# Patient Record
Sex: FEMALE | Race: WHITE | NOT HISPANIC OR LATINO | Employment: OTHER | URBAN - NONMETROPOLITAN AREA
[De-identification: names, ages, dates, MRNs, and addresses within clinical notes are randomized per-mention and may not be internally consistent; named-entity substitution may affect disease eponyms.]

---

## 2017-01-18 ENCOUNTER — OFFICE VISIT (OUTPATIENT)
Dept: PAIN MEDICINE | Facility: CLINIC | Age: 67
End: 2017-01-18

## 2017-01-18 VITALS
DIASTOLIC BLOOD PRESSURE: 78 MMHG | SYSTOLIC BLOOD PRESSURE: 130 MMHG | WEIGHT: 181.1 LBS | HEIGHT: 63 IN | BODY MASS INDEX: 32.09 KG/M2

## 2017-01-18 DIAGNOSIS — M25.9 MULTIPLE JOINT COMPLAINTS: Primary | ICD-10-CM

## 2017-01-18 DIAGNOSIS — M79.641 BILATERAL HAND PAIN: ICD-10-CM

## 2017-01-18 DIAGNOSIS — Z79.891 LONG TERM (CURRENT) USE OF OPIATE ANALGESIC: ICD-10-CM

## 2017-01-18 DIAGNOSIS — M79.642 BILATERAL HAND PAIN: ICD-10-CM

## 2017-01-18 DIAGNOSIS — M06.9 RHEUMATOID ARTHRITIS INVOLVING MULTIPLE JOINTS (HCC): ICD-10-CM

## 2017-01-18 PROCEDURE — 99213 OFFICE O/P EST LOW 20 MIN: CPT | Performed by: NURSE PRACTITIONER

## 2017-01-18 NOTE — PROGRESS NOTES
"Neetu Howe is a 66 y.o. female.   1950    HPI:   Location: Joints multiple  Quality: aching, sharp, dull and and no tingling  Severity: 6/10  Timing: constant  Alleviating: pain medication  Aggravating: increased activity      Pt followed up with Dr. Seymour, No new medications added to RA treatment... Dr seymour states \"use opiate medications and plaquenil at this time\"  . Recent increase in Norco medication has had positive effects on pain management. PT states \"does better\". No SE noted. Pt in electric WC.      The following portions of the patient's history were reviewed by me and updated as appropriate: allergies, current medications, past family history, past medical history, past social history, past surgical history and problem list.    Past Medical History   Diagnosis Date   • Arthritis    • Benign essential hypertension    • Borderline glaucoma    • Drug therapy      Long-term drug therapy plaquenil, no adverse effects      • Encounter for long-term (current) drug use      Other long term (current) drug therapy      • Epiretinal membrane      OD   • Fibromyositis    • Joint pain    • Nuclear cataract      refractive change OD      • Rheumatoid arthritis    • Rheumatoid arthritis        Social History     Social History   • Marital status: Single     Spouse name: N/A   • Number of children: N/A   • Years of education: N/A     Occupational History   • Not on file.     Social History Main Topics   • Smoking status: Former Smoker   • Smokeless tobacco: Not on file   • Alcohol use No   • Drug use: No   • Sexual activity: Defer     Other Topics Concern   • Not on file     Social History Narrative       Family History   Problem Relation Age of Onset   • Asthma Mother    • Cancer Father      Other   • Hyperlipidemia Father    • Hypertension Father          Current Outpatient Prescriptions:   •  Abatacept (ORENCIA IV), Infuse  into a venous catheter every 30 (thirty) days., Disp: , Rfl:   •  Ascorbic Acid " (VITAMIN C) 500 MG chewable tablet, Chew 1 tablet daily., Disp: , Rfl:   •  B Complex-Folic Acid (B COMPLEX-VITAMIN B12 PO), Take 0.5 tablets by mouth daily., Disp: , Rfl:   •  Calcium & Magnesium Carbonates (MYLANTA PO), Take 1 tablet by mouth daily as needed. 1 tablet, chewable every day Oral PRN, Disp: , Rfl:   •  esomeprazole (NexIUM) 40 MG capsule, Take 40 mg by mouth daily., Disp: , Rfl:   •  ferrous sulfate 325 (65 FE) MG tablet, Take 325 mg by mouth daily., Disp: , Rfl:   •  FLUZONE HIGH-DOSE 0.5 ML suspension prefilled syringe injection, ADM 0.5ML IM UTD, Disp: , Rfl: 0  •  FOLIC ACID PO, Take 1 tablet by mouth daily., Disp: , Rfl:   •  Hydrocodone-Acetaminophen (LORTAB )  MG per tablet, Take 1 tablet by mouth every 4 (four) hours as needed for moderate pain (4-6)., Disp: , Rfl:   •  HYDROcodone-acetaminophen (NORCO)  MG per tablet, TK 1 T PO  QID, Disp: , Rfl: 0  •  hydroxychloroquine (PLAQUENIL) 200 MG tablet, Take 300 mg by mouth daily. 1.5 tablet every day Oral, Disp: , Rfl:   •  lactulose (CHRONULAC) 10 GM/15ML solution, Take 30 g by mouth 3 (three) times a day., Disp: , Rfl:   •  lisinopril-hydrochlorothiazide (PRINZIDE,ZESTORETIC) 20-12.5 MG per tablet, TK 1 T PO D, Disp: , Rfl: 0  •  LISINOPRIL-HYDROCHLOROTHIAZIDE PO, Take 1 tablet by mouth daily., Disp: , Rfl:   •  omeprazole (priLOSEC) 40 MG capsule, TK 1 C PO D, Disp: , Rfl: 0  •  oxybutynin (DITROPAN) 5 MG tablet, Take 5 mg by mouth 2 (two) times a day., Disp: , Rfl:   •  Polyethylene Glycol 3350 (MIRALAX PO), Take  by mouth daily. Miralax 17 gram/dose Oral Powder, Disp: , Rfl:   •  predniSONE (DELTASONE) 5 MG tablet, TK 2 TS PO D, Disp: , Rfl: 1  •  ranitidine (ZANTAC) 150 MG tablet, Take 150 mg by mouth daily as needed for heartburn., Disp: , Rfl:   •  simethicone (MYLICON) 80 MG chewable tablet, Chew 160 mg daily as needed., Disp: , Rfl:     No Known Allergies      Review of Systems   Musculoskeletal:        Multiple joint  sites     10 system review of systems was reviewed and negative except for above.    Physical Exam   Constitutional: She is oriented to person, place, and time. She appears well-developed and well-nourished. No distress.   Eyes: EOM are normal.   Musculoskeletal:        Right forearm: She exhibits tenderness.        Left forearm: She exhibits tenderness.        Right hand: She exhibits tenderness.        Left hand: She exhibits tenderness.   Multiple joint tenderness noted      Obvious rheumatoid changes to hands.   Neurological: She is alert and oriented to person, place, and time.   Electric wheelchair   Psychiatric: She has a normal mood and affect. Her behavior is normal. Judgment normal.       Neetu was seen today for joint swelling.    Diagnoses and all orders for this visit:    Multiple joint complaints    Bilateral hand pain    Rheumatoid arthritis involving multiple joints    Long term (current) use of opiate analgesic  -     Pain Management Profile (13 Drugs) Urine        Medication: Patient reports no negative side effects, Patient reports appropriate usage and storage habits, Patient's opioid provides enough reflief to be more active and perform activities of daily living with less discomfort. and Refill opioid medication as above. Discussed with Dr. Aguilar, opiate medications refilled x 2 scripts.    Interventional: none at this time. Pt follows up with Dr. Seymour.     Rehab: Home stretches at home.     Behavioral: No aberrant behavior noted. [facility] Report # [id number] was reviewed and is consistent with stated history    Urine drug screen Ordered today to test for drugs of abuse and prescribed medications          This document has been electronically signed by ELAINE Hilario on January 18, 2017 5:51 PM          This document has been electronically signed by ELAINE Hilario on January 18, 2017 5:51 PM

## 2017-01-20 LAB
6MAM UR QL: NOT DETECTED
7AMINOCLONAZEPAM UR QL: NOT DETECTED
A-OH ALPRAZ UR QL: NOT DETECTED
ALPRAZ UR QL: NOT DETECTED
AMPHET UR QL SCN: NOT DETECTED
BARBITURATES UR QL: NOT DETECTED
BUPRENORPHINE UR QL: NOT DETECTED
BZE UR QL: NOT DETECTED
CARBOXYTHC UR QL: NOT DETECTED
CARISOPRODOL UR QL: NOT DETECTED
CLONAZEPAM UR QL: NOT DETECTED
CODEINE UR QL: NOT DETECTED
CREAT UR-MCNC: 42.8 MG/DL (ref 20–400)
DIAZEPAM UR QL: NOT DETECTED
ETHYL GLUCURONIDE UR QL: NOT DETECTED
FENTANYL UR QL: NOT DETECTED
HYDROCODONE UR QL: PRESENT
HYDROMORPHONE UR QL: NOT DETECTED
LORAZEPAM UR QL: NOT DETECTED
MDA UR QL: NOT DETECTED
MDEA UR QL: NOT DETECTED
MDMA UR QL: NOT DETECTED
MEPERIDINE UR QL: NOT DETECTED
METHADONE UR QL: NOT DETECTED
METHAMPHET UR QL: NOT DETECTED
MIDAZOLAM UR QL SCN: NOT DETECTED
MORPHINE UR QL: NOT DETECTED
NORBUPRENORPHINE UR QL CFM: NOT DETECTED
NORDIAZEPAM UR QL: NOT DETECTED
NORFENTANYL UR QL: NOT DETECTED
NORHYDROCODONE UR QL CFM: PRESENT
NOROXYCODONE UR QL CFM: NOT DETECTED
NOROXYMORPHONE: NOT DETECTED
OXAZEPAM UR QL: NOT DETECTED
OXYCODONE UR QL: NOT DETECTED
OXYMORPHONE UR QL: NOT DETECTED
PATHOLOGY STUDY: NORMAL
PCP UR QL: NOT DETECTED
PHENTERMINE UR QL: NOT DETECTED
PPAA UR QL: NOT DETECTED
PROPOXYPH UR QL: NOT DETECTED
SERVICE CMNT-IMP: NORMAL
TAPENTADOL UR QL SCN: NOT DETECTED
TAPENTADOL-O-SULF: NOT DETECTED
TEMAZEPAM UR QL: NOT DETECTED
TRAMADOL UR QL: NOT DETECTED
ZOLPIDEM UR QL: NOT DETECTED

## 2017-01-20 NOTE — PROGRESS NOTES
Addendum:  I have reviewed this patient with ELAINE Renteria.  I agree with the above findings and plan.  I have personally spoken with the patient today, and confirmed key findings.

## 2017-03-24 ENCOUNTER — APPOINTMENT (OUTPATIENT)
Dept: LAB | Facility: HOSPITAL | Age: 67
End: 2017-03-24

## 2017-03-24 ENCOUNTER — OFFICE VISIT (OUTPATIENT)
Dept: OBSTETRICS AND GYNECOLOGY | Facility: CLINIC | Age: 67
End: 2017-03-24

## 2017-03-24 VITALS
BODY MASS INDEX: 31.54 KG/M2 | HEIGHT: 63 IN | SYSTOLIC BLOOD PRESSURE: 120 MMHG | DIASTOLIC BLOOD PRESSURE: 82 MMHG | WEIGHT: 178 LBS

## 2017-03-24 DIAGNOSIS — N95.2 ATROPHIC VAGINITIS: Primary | ICD-10-CM

## 2017-03-24 DIAGNOSIS — N77.1 VAGINITIS, VULVITIS AND VULVOVAGINITIS IN DISEASES CLASSIFIED ELSEWHERE: ICD-10-CM

## 2017-03-24 LAB
BILIRUB UR QL STRIP: NEGATIVE
CANDIDA ALBICANS: NEGATIVE
CLARITY UR: CLEAR
COLOR UR: YELLOW
GARDNERELLA VAGINALIS: NEGATIVE
GLUCOSE UR STRIP-MCNC: NEGATIVE MG/DL
HGB UR QL STRIP.AUTO: NEGATIVE
KETONES UR QL STRIP: NEGATIVE
LEUKOCYTE ESTERASE UR QL STRIP.AUTO: NEGATIVE
NITRITE UR QL STRIP: NEGATIVE
PH UR STRIP.AUTO: 7 [PH] (ref 5–9)
PROT UR QL STRIP: NEGATIVE
SP GR UR STRIP: 1.01 (ref 1–1.03)
TRICHOMONAS VAGINALIS PCR: NEGATIVE
UROBILINOGEN UR QL STRIP: NORMAL

## 2017-03-24 PROCEDURE — 87086 URINE CULTURE/COLONY COUNT: CPT | Performed by: NURSE PRACTITIONER

## 2017-03-24 PROCEDURE — 87480 CANDIDA DNA DIR PROBE: CPT | Performed by: NURSE PRACTITIONER

## 2017-03-24 PROCEDURE — 87510 GARDNER VAG DNA DIR PROBE: CPT | Performed by: NURSE PRACTITIONER

## 2017-03-24 PROCEDURE — 99213 OFFICE O/P EST LOW 20 MIN: CPT | Performed by: NURSE PRACTITIONER

## 2017-03-24 PROCEDURE — 87660 TRICHOMONAS VAGIN DIR PROBE: CPT | Performed by: NURSE PRACTITIONER

## 2017-03-24 PROCEDURE — 81003 URINALYSIS AUTO W/O SCOPE: CPT | Performed by: NURSE PRACTITIONER

## 2017-03-24 NOTE — PROGRESS NOTES
"Subjective   History of Present Illness    Neetu Howe is a 66 y.o. female who presents with c/o vaginal burning that is located on the outside and inside. She has been seen by a urologist who ruled out UTIs and concluded that her issue was related to atrophic vaginitis. This was 1.5 weeks ago. She was prescribed premarin cream and reports that made the burning worse, she was advised to stop taking it then.     Sexually active?: No  Postmenopausal?: Yes  HRT?: no  Hysterectomy?: no    Date last pap: 2004  History of abnormal Pap smear: no  Date of last mammogram: none on file  History of abnormal mammogram: no    /82  Ht 63\" (160 cm)  Wt 178 lb (80.7 kg)  LMP  (LMP Unknown)  Breastfeeding? No  BMI 31.53 kg/m2    Past Medical History:   Diagnosis Date   • Arthritis    • Benign essential hypertension    • Borderline glaucoma    • Drug therapy     Long-term drug therapy plaquenil, no adverse effects      • Encounter for long-term (current) drug use     Other long term (current) drug therapy      • Epiretinal membrane     OD   • Fibromyositis    • Joint pain    • Nuclear cataract     refractive change OD      • Rheumatoid arthritis    • Rheumatoid arthritis        No past surgical history on file.    The following portions of the patient's history were reviewed and updated as appropriate: allergies, current medications, past family history, past medical history, past social history, past surgical history and problem list.    Review of Systems    Constitutional:  No fatigue, no weight loss, no weight gain, no fever, no chills   Respiratory: No dyspnea, no cough, no hemoptysis, no wheezing, no pleuritic pain   Cardiovascular: No chest pain, no palpitations, no arrhythmia, no orthopnea, no nocturnal dyspnea, no edema, no claudication   Breasts: No discharge from nipple, No breast tenderness and No breast mass   Gastrointestinal: No loss of appetite, No dysphagia, No abdominal pain, No nausea, No vomiting, " No change in bowel habits, No diarrhea, No constipation and No blood in stool   Genitourinary: No increased frequency of urination, No dysuria, No hematuria, No nocturia, No urinary incontinence, No vaginal discharge, No abnormal vaginal bleeding, No pelvic pain, No menstrual problem and Menopausal problem   Skin: No skin rash, No skin lesion, No dry skin, No pruritus and No nail problem   Neurologic: No headache, No dizziness, No lightheadedness, No syncope, No vertigo, No weakness, No numbness, No tremor and No paresthesia   Psychiatric: No difficulty sleeping, No mood swings, No feeling anxious, No confusion and No memory loss          Objective   Physical Exam    General:  Alert and oriented x 3, Cooperative, Well developed & well nourished and No acute distress   Genitourinary: Vulva normal, vaginal mucosa abnormal, bladder nondistended and nontender, cervix normal, uterus normal size and nontender, no adnexal/pelvic tenderness or masses, Bartholin's/Askov/Urethral gland WNL, vaginal tissue atrophic   Skin: Skin warm and dry and Pattern of hair growth normal       Assessment/Plan   Neetu was seen today for personal problem.    Diagnoses and all orders for this visit:    Atrophic vaginitis    Vaginitis, vulvitis and vulvovaginitis in diseases classified elsewhere   -     Gardnerella vaginalis, Trichomonas vaginalis, Candida albicans, PCR  -     Urinalysis  -     Urine Culture    Other orders  -     Ospemifene (OSPHENA) 60 MG tablet; Take 1 tablet by mouth Daily.      All questions answered.  Will rule out vaginal infections.  Will rule out UTI.  Follow up if symptoms persist or worsen. Rx osphena.

## 2017-03-25 LAB — BACTERIA SPEC AEROBE CULT: NORMAL

## 2017-03-28 RX ORDER — ESTRADIOL 0.1 MG/G
CREAM VAGINAL
Qty: 42.5 G | Refills: 12 | Status: SHIPPED | OUTPATIENT
Start: 2017-03-28 | End: 2018-06-13

## 2017-03-30 ENCOUNTER — OFFICE VISIT (OUTPATIENT)
Dept: PAIN MEDICINE | Facility: CLINIC | Age: 67
End: 2017-03-30

## 2017-03-30 VITALS
HEIGHT: 63 IN | SYSTOLIC BLOOD PRESSURE: 120 MMHG | WEIGHT: 176 LBS | BODY MASS INDEX: 31.18 KG/M2 | DIASTOLIC BLOOD PRESSURE: 82 MMHG

## 2017-03-30 DIAGNOSIS — M25.9 MULTIPLE JOINT COMPLAINTS: Primary | ICD-10-CM

## 2017-03-30 DIAGNOSIS — Z79.899 HIGH RISK MEDICATIONS (NOT ANTICOAGULANTS) LONG-TERM USE: ICD-10-CM

## 2017-03-30 DIAGNOSIS — Z87.39 HX OF RHEUMATOID ARTHRITIS: ICD-10-CM

## 2017-03-30 PROCEDURE — 99214 OFFICE O/P EST MOD 30 MIN: CPT | Performed by: PAIN MEDICINE

## 2017-03-30 RX ORDER — HYDROCODONE BITARTRATE AND ACETAMINOPHEN 10; 325 MG/1; MG/1
1 TABLET ORAL
Qty: 150 TABLET | Refills: 0 | Status: SHIPPED | OUTPATIENT
Start: 2017-03-30 | End: 2017-04-29

## 2017-03-30 RX ORDER — OXYBUTYNIN CHLORIDE 5 MG/1
5 TABLET ORAL
COMMUNITY
Start: 2016-12-12 | End: 2018-06-13

## 2017-03-30 RX ORDER — OMEPRAZOLE 40 MG/1
40 CAPSULE, DELAYED RELEASE ORAL DAILY
COMMUNITY
Start: 2016-12-12 | End: 2018-06-13

## 2017-03-30 RX ORDER — LISINOPRIL AND HYDROCHLOROTHIAZIDE 20; 12.5 MG/1; MG/1
1 TABLET ORAL DAILY
COMMUNITY
Start: 2016-12-12 | End: 2018-06-13

## 2017-03-30 RX ORDER — HYDROCODONE BITARTRATE AND ACETAMINOPHEN 10; 325 MG/1; MG/1
1 TABLET ORAL EVERY 6 HOURS
COMMUNITY
Start: 2015-08-20 | End: 2018-05-24

## 2017-03-30 RX ORDER — HYDROXYCHLOROQUINE SULFATE 200 MG/1
TABLET, FILM COATED ORAL
COMMUNITY
Start: 2016-12-13 | End: 2018-06-13

## 2017-03-30 NOTE — PROGRESS NOTES
Neetu Howe is a 66 y.o. female.   1950    HPI:   Location: Joints mulitple  Quality: aching, sharp, dull and no tingling  Severity: 6/10  Timing: constant  Alleviating: pain medication  Aggravating: increased activity    Sees Dr. Seymour for Rheum.  Taking new trx.  Doing well.  Patient reports that the opioid medication still provides her good relief and allows increased activity than she would have without the opioid medication.  She denies side effects.      The following portions of the patient's history were reviewed by me and updated as appropriate: allergies, current medications, past family history, past medical history, past social history, past surgical history and problem list.    Past Medical History:   Diagnosis Date   • Arthritis    • Benign essential hypertension    • Borderline glaucoma    • Drug therapy     Long-term drug therapy plaquenil, no adverse effects      • Encounter for long-term (current) drug use     Other long term (current) drug therapy      • Epiretinal membrane     OD   • Fibromyositis    • Joint pain    • Nuclear cataract     refractive change OD      • Rheumatoid arthritis    • Rheumatoid arthritis        Social History     Social History   • Marital status: Single     Spouse name: N/A   • Number of children: N/A   • Years of education: N/A     Occupational History   • Not on file.     Social History Main Topics   • Smoking status: Former Smoker   • Smokeless tobacco: Not on file   • Alcohol use No   • Drug use: No   • Sexual activity: No     Other Topics Concern   • Not on file     Social History Narrative       Family History   Problem Relation Age of Onset   • Asthma Mother    • Cancer Father      Other   • Hyperlipidemia Father    • Hypertension Father          Current Outpatient Prescriptions:   •  Ascorbic Acid (VITAMIN C) 500 MG chewable tablet, Chew 1 tablet daily., Disp: , Rfl:   •  B Complex-Folic Acid (B COMPLEX-VITAMIN B12 PO), Take 0.5 tablets by mouth  daily., Disp: , Rfl:   •  Calcium & Magnesium Carbonates (MYLANTA PO), Take 1 tablet by mouth daily as needed. 1 tablet, chewable every day Oral PRN, Disp: , Rfl:   •  Certolizumab Pegol (CIMZIA PREFILLED SC), Inject  under the skin., Disp: , Rfl:   •  Certolizumab Pegol 2 X 200 MG kit, Inject 400 mg under the skin., Disp: , Rfl:   •  esomeprazole (NexIUM) 40 MG capsule, Take 40 mg by mouth daily., Disp: , Rfl:   •  estradiol (ESTRACE VAGINAL) 0.1 MG/GM vaginal cream, Insert 4 gm daily x 2 wks, then 2 gm daily x 2 wks, then 1 gm twice weekly for maintenance., Disp: 42.5 g, Rfl: 12  •  ferrous sulfate 325 (65 FE) MG tablet, Take 325 mg by mouth daily., Disp: , Rfl:   •  FOLIC ACID PO, Take 1 tablet by mouth daily., Disp: , Rfl:   •  Hydrocodone-Acetaminophen (LORTAB )  MG per tablet, Take 1 tablet by mouth every 4 (four) hours as needed for moderate pain (4-6)., Disp: , Rfl:   •  HYDROcodone-acetaminophen (NORCO)  MG per tablet, TK 1 T PO  QID, Disp: , Rfl: 0  •  hydroxychloroquine (PLAQUENIL) 200 MG tablet, Take 300 mg by mouth Daily. 1.0 tablet every day Oral, Disp: , Rfl:   •  lactulose (CHRONULAC) 10 GM/15ML solution, Take 30 g by mouth 3 (three) times a day., Disp: , Rfl:   •  lisinopril-hydrochlorothiazide (PRINZIDE,ZESTORETIC) 20-12.5 MG per tablet, TK 1 T PO D, Disp: , Rfl: 0  •  omeprazole (priLOSEC) 40 MG capsule, TK 1 C PO D, Disp: , Rfl: 0  •  Ospemifene (OSPHENA) 60 MG tablet, Take 1 tablet by mouth Daily., Disp: 30 tablet, Rfl: 11  •  Ospemifene 60 MG tablet, Take 60 mg by mouth Daily., Disp: 90 tablet, Rfl: 0  •  Polyethylene Glycol 3350 (MIRALAX PO), Take  by mouth daily. Miralax 17 gram/dose Oral Powder, Disp: , Rfl:   •  polyethylene glycol pack, Take 1 packet by mouth., Disp: , Rfl:   •  Probiotic Product ([employer] ADULT PROBIOTIC PO), Take 1 each by mouth., Disp: , Rfl:   •  ranitidine (ZANTAC) 150 MG tablet, Take 150 mg by mouth daily as needed for heartburn., Disp: , Rfl:   •   flintstones complete (FLINTSTONES) 60 MG chewable tablet, Chew., Disp: , Rfl:   •  FLUZONE HIGH-DOSE 0.5 ML suspension prefilled syringe injection, ADM 0.5ML IM UTD, Disp: , Rfl: 0  •  HYDROcodone-acetaminophen (NORCO)  MG per tablet, Take 1 tablet by mouth Every 6 (Six) Hours., Disp: , Rfl:   •  HYDROcodone-acetaminophen (NORCO)  MG per tablet, Take 1 tablet by mouth 5 (Five) Times a Day for 30 days., Disp: 150 tablet, Rfl: 0  •  HYDROcodone-acetaminophen (NORCO)  MG per tablet, Take 1 tablet by mouth 5 (Five) Times a Day for 30 days., Disp: 150 tablet, Rfl: 0  •  hydroxychloroquine (PLAQUENIL) 200 MG tablet, TAKE 1 AND 1/2 TABLET BY MOUTH EVERY DAY, Disp: , Rfl:   •  lisinopril-hydrochlorothiazide (PRINZIDE,ZESTORETIC) 20-12.5 MG per tablet, Take 1 tablet by mouth., Disp: , Rfl:   •  LISINOPRIL-HYDROCHLOROTHIAZIDE PO, Take 1 tablet by mouth daily., Disp: , Rfl:   •  omeprazole (priLOSEC) 40 MG capsule, Take 40 mg by mouth., Disp: , Rfl:   •  oxybutynin (DITROPAN) 5 MG tablet, Take 5 mg by mouth 2 (two) times a day., Disp: , Rfl:   •  oxybutynin (DITROPAN) 5 MG tablet, Take 5 mg by mouth., Disp: , Rfl:   •  predniSONE (DELTASONE) 5 MG tablet, TK 2 TS PO D, Disp: , Rfl: 1  •  simethicone (MYLICON) 80 MG chewable tablet, Chew 160 mg daily as needed., Disp: , Rfl:     Allergies   Allergen Reactions   • Sulfa Antibiotics Nausea Only     Intense nausa   • Doxycycline      Body movements   • Nitrofurantoin      Makes her hurt all over.  Tendons felt like being tore   • Ciprofloxacin Nausea Only         Review of Systems   Musculoskeletal:        Joints multiple     10 system review of systems was reviewed and negative except for above.    Physical Exam   Constitutional: She appears well-developed and well-nourished. No distress.   Musculoskeletal:   Obvious rheumatoid changes to hands.   Neurological: She is alert.   Electric scooter   Psychiatric: She has a normal mood and affect. Her behavior is  normal. Judgment normal.       Neetu was seen today for joint swelling.    Diagnoses and all orders for this visit:    Multiple joint complaints    Hx of rheumatoid arthritis    High risk medications (not anticoagulants) long-term use    Other orders  -     HYDROcodone-acetaminophen (NORCO)  MG per tablet; Take 1 tablet by mouth 5 (Five) Times a Day for 30 days.  -     HYDROcodone-acetaminophen (NORCO)  MG per tablet; Take 1 tablet by mouth 5 (Five) Times a Day for 30 days.      Medication: Patient reports no negative side effects, Patient reports appropriate usage and storage habits, Patient's opioid provides enough reflief to be more active and perform activities of daily living with less discomfort. and Refill opioid medication as above    Interventional: none at this time    Rehab: none at this time    Behavioral: No aberrant behavior noted. [facility] Report #[id number]  was reviewed and is consistent with stated history    Urine drug screen Reviewed from last visit and is appropriate          This document has been electronically signed by Cristopher Aguilar MD on March 30, 2017 7:59 AM

## 2017-05-26 ENCOUNTER — OFFICE VISIT (OUTPATIENT)
Dept: PAIN MEDICINE | Facility: CLINIC | Age: 67
End: 2017-05-26

## 2017-05-26 VITALS
BODY MASS INDEX: 31.89 KG/M2 | SYSTOLIC BLOOD PRESSURE: 120 MMHG | WEIGHT: 180 LBS | HEIGHT: 63 IN | DIASTOLIC BLOOD PRESSURE: 68 MMHG

## 2017-05-26 DIAGNOSIS — Z79.899 HIGH RISK MEDICATIONS (NOT ANTICOAGULANTS) LONG-TERM USE: ICD-10-CM

## 2017-05-26 DIAGNOSIS — M25.9 MULTIPLE JOINT COMPLAINTS: Primary | ICD-10-CM

## 2017-05-26 DIAGNOSIS — M06.9 RHEUMATOID ARTHRITIS INVOLVING MULTIPLE JOINTS (HCC): ICD-10-CM

## 2017-05-26 PROCEDURE — 99213 OFFICE O/P EST LOW 20 MIN: CPT | Performed by: PAIN MEDICINE

## 2017-05-26 RX ORDER — HYDROCODONE BITARTRATE AND ACETAMINOPHEN 10; 325 MG/1; MG/1
1 TABLET ORAL
Qty: 150 TABLET | Refills: 0 | Status: SHIPPED | OUTPATIENT
Start: 2017-05-26 | End: 2017-06-25

## 2017-07-27 ENCOUNTER — OFFICE VISIT (OUTPATIENT)
Dept: PAIN MEDICINE | Facility: CLINIC | Age: 67
End: 2017-07-27

## 2017-07-27 VITALS
WEIGHT: 167 LBS | SYSTOLIC BLOOD PRESSURE: 122 MMHG | BODY MASS INDEX: 29.59 KG/M2 | DIASTOLIC BLOOD PRESSURE: 70 MMHG | HEIGHT: 63 IN

## 2017-07-27 DIAGNOSIS — Z79.891 LONG TERM (CURRENT) USE OF OPIATE ANALGESIC: ICD-10-CM

## 2017-07-27 DIAGNOSIS — M06.9 RHEUMATOID ARTHRITIS INVOLVING MULTIPLE JOINTS (HCC): ICD-10-CM

## 2017-07-27 DIAGNOSIS — M25.9 MULTIPLE JOINT COMPLAINTS: Primary | ICD-10-CM

## 2017-07-27 PROCEDURE — 99213 OFFICE O/P EST LOW 20 MIN: CPT | Performed by: PAIN MEDICINE

## 2017-07-27 RX ORDER — HYDROCODONE BITARTRATE AND ACETAMINOPHEN 10; 325 MG/1; MG/1
1 TABLET ORAL
Qty: 150 TABLET | Refills: 0 | Status: SHIPPED | OUTPATIENT
Start: 2017-07-27 | End: 2017-08-26

## 2017-07-27 NOTE — PROGRESS NOTES
Neetu Howe is a 66 y.o. female.   1950    HPI:   Location: Joints  Quality: sharp  Severity: 10/10  Timing: constant  Alleviating: pain medication  Aggravating: increased activity     Patient remains on chronic long-term opioid medication for management of joint pains from rheumatoid arthritis.  She continues to see rheumatology.  She sees him again in 2 weeks.  The opioid medication does provide her relief and allows her to be as active as she can be.  She continues to use a motorized scooter for ambulation.    The following portions of the patient's history were reviewed by me and updated as appropriate: allergies, current medications, past family history, past medical history, past social history, past surgical history and problem list.    Past Medical History:   Diagnosis Date   • Arthritis    • Benign essential hypertension    • Borderline glaucoma    • Drug therapy     Long-term drug therapy plaquenil, no adverse effects      • Encounter for long-term (current) drug use     Other long term (current) drug therapy      • Epiretinal membrane     OD   • Fibromyositis    • Joint pain    • Nuclear cataract     refractive change OD      • Rheumatoid arthritis    • Rheumatoid arthritis        Social History     Social History   • Marital status: Single     Spouse name: N/A   • Number of children: N/A   • Years of education: N/A     Occupational History   • Not on file.     Social History Main Topics   • Smoking status: Former Smoker   • Smokeless tobacco: Never Used   • Alcohol use No   • Drug use: No   • Sexual activity: No     Other Topics Concern   • Not on file     Social History Narrative       Family History   Problem Relation Age of Onset   • Asthma Mother    • Cancer Father      Other   • Hyperlipidemia Father    • Hypertension Father          Current Outpatient Prescriptions:   •  Ascorbic Acid (VITAMIN C) 500 MG chewable tablet, Chew 1 tablet daily., Disp: , Rfl:   •  B Complex-Folic Acid (B  COMPLEX-VITAMIN B12 PO), Take 0.5 tablets by mouth daily., Disp: , Rfl:   •  Calcium & Magnesium Carbonates (MYLANTA PO), Take 1 tablet by mouth daily as needed. 1 tablet, chewable every day Oral PRN, Disp: , Rfl:   •  Certolizumab Pegol (CIMZIA PREFILLED SC), Inject  under the skin., Disp: , Rfl:   •  Certolizumab Pegol 2 X 200 MG kit, Inject 400 mg under the skin., Disp: , Rfl:   •  esomeprazole (NexIUM) 40 MG capsule, Take 40 mg by mouth daily., Disp: , Rfl:   •  estradiol (ESTRACE VAGINAL) 0.1 MG/GM vaginal cream, Insert 4 gm daily x 2 wks, then 2 gm daily x 2 wks, then 1 gm twice weekly for maintenance., Disp: 42.5 g, Rfl: 12  •  ferrous sulfate 325 (65 FE) MG tablet, Take 325 mg by mouth daily., Disp: , Rfl:   •  flintstones complete (FLINTSTONES) 60 MG chewable tablet, Chew., Disp: , Rfl:   •  FLUZONE HIGH-DOSE 0.5 ML suspension prefilled syringe injection, ADM 0.5ML IM UTD, Disp: , Rfl: 0  •  FOLIC ACID PO, Take 1 tablet by mouth daily., Disp: , Rfl:   •  Hydrocodone-Acetaminophen (LORTAB )  MG per tablet, Take 1 tablet by mouth every 4 (four) hours as needed for moderate pain (4-6)., Disp: , Rfl:   •  HYDROcodone-acetaminophen (NORCO)  MG per tablet, TK 1 T PO  QID, Disp: , Rfl: 0  •  HYDROcodone-acetaminophen (NORCO)  MG per tablet, Take 1 tablet by mouth Every 6 (Six) Hours., Disp: , Rfl:   •  hydroxychloroquine (PLAQUENIL) 200 MG tablet, Take 300 mg by mouth Daily. 1.0 tablet every day Oral, Disp: , Rfl:   •  hydroxychloroquine (PLAQUENIL) 200 MG tablet, TAKE 1 AND 1/2 TABLET BY MOUTH EVERY DAY, Disp: , Rfl:   •  lactulose (CHRONULAC) 10 GM/15ML solution, Take 30 g by mouth 3 (three) times a day., Disp: , Rfl:   •  lisinopril-hydrochlorothiazide (PRINZIDE,ZESTORETIC) 20-12.5 MG per tablet, TK 1 T PO D, Disp: , Rfl: 0  •  lisinopril-hydrochlorothiazide (PRINZIDE,ZESTORETIC) 20-12.5 MG per tablet, Take 1 tablet by mouth., Disp: , Rfl:   •  LISINOPRIL-HYDROCHLOROTHIAZIDE PO, Take 1  tablet by mouth daily., Disp: , Rfl:   •  omeprazole (priLOSEC) 40 MG capsule, TK 1 C PO D, Disp: , Rfl: 0  •  omeprazole (priLOSEC) 40 MG capsule, Take 40 mg by mouth., Disp: , Rfl:   •  Ospemifene (OSPHENA) 60 MG tablet, Take 1 tablet by mouth Daily., Disp: 30 tablet, Rfl: 11  •  Ospemifene 60 MG tablet, Take 60 mg by mouth Daily., Disp: 90 tablet, Rfl: 0  •  oxybutynin (DITROPAN) 5 MG tablet, Take 5 mg by mouth 2 (two) times a day., Disp: , Rfl:   •  oxybutynin (DITROPAN) 5 MG tablet, Take 5 mg by mouth., Disp: , Rfl:   •  Polyethylene Glycol 3350 (MIRALAX PO), Take  by mouth daily. Miralax 17 gram/dose Oral Powder, Disp: , Rfl:   •  polyethylene glycol pack, Take 1 packet by mouth., Disp: , Rfl:   •  predniSONE (DELTASONE) 5 MG tablet, TK 2 TS PO D, Disp: , Rfl: 1  •  Probiotic Product ([employer] ADULT PROBIOTIC PO), Take 1 each by mouth., Disp: , Rfl:   •  ranitidine (ZANTAC) 150 MG tablet, Take 150 mg by mouth daily as needed for heartburn., Disp: , Rfl:   •  simethicone (MYLICON) 80 MG chewable tablet, Chew 160 mg daily as needed., Disp: , Rfl:   •  HYDROcodone-acetaminophen (NORCO)  MG per tablet, Take 1 tablet by mouth 5 (Five) Times a Day for 30 days., Disp: 150 tablet, Rfl: 0  •  HYDROcodone-acetaminophen (NORCO)  MG per tablet, Take 1 tablet by mouth 5 (Five) Times a Day for 30 days., Disp: 150 tablet, Rfl: 0    Allergies   Allergen Reactions   • Sulfa Antibiotics Nausea Only     Intense nausa   • Doxycycline      Body movements  Body movements   • Nitrofurantoin      Makes her hurt all over.  Tendons felt like being tore  Makes her hurt all over.  Tendons felt like being tore   • Ciprofloxacin Nausea Only       Review of Systems   Musculoskeletal: Positive for joint swelling (all over joint pain).   All other systems reviewed and are negative.    All systems reviewed and negative except for above.    Physical Exam   Constitutional: She appears well-developed and well-nourished. No distress.    Musculoskeletal:   Obvious rheumatoid changes to hands.    Painful attempted shoulder abduction bilaterally, worse on right    Neurological: She is alert.   Electric scooter   Psychiatric: She has a normal mood and affect. Her behavior is normal. Judgment normal.       Neetu was seen today for joint pain.    Diagnoses and all orders for this visit:    Multiple joint complaints    Rheumatoid arthritis involving multiple joints    Long term (current) use of opiate analgesic    Other orders  -     HYDROcodone-acetaminophen (NORCO)  MG per tablet; Take 1 tablet by mouth 5 (Five) Times a Day for 30 days.  -     HYDROcodone-acetaminophen (NORCO)  MG per tablet; Take 1 tablet by mouth 5 (Five) Times a Day for 30 days.      Medication: Patient reports no negative side effects, Patient reports appropriate usage and storage habits, Patient's opioid provides enough reflief to be more active and perform activities of daily living with less discomfort. and Refill opioid medication as above    Interventional: none at this time    Rehab: none at this time    Behavioral: No aberrant behavior noted. [facility] Report #[id number]  was reviewed and is consistent with stated history    Urine drug screen None at this time          This document has been electronically signed by Cristopher Aguilar MD on July 27, 2017 7:51 AM

## 2017-09-26 ENCOUNTER — OFFICE VISIT (OUTPATIENT)
Dept: PAIN MEDICINE | Facility: CLINIC | Age: 67
End: 2017-09-26

## 2017-09-26 VITALS
DIASTOLIC BLOOD PRESSURE: 70 MMHG | SYSTOLIC BLOOD PRESSURE: 122 MMHG | HEIGHT: 64 IN | WEIGHT: 164 LBS | BODY MASS INDEX: 28 KG/M2

## 2017-09-26 DIAGNOSIS — Z79.899 HIGH RISK MEDICATIONS (NOT ANTICOAGULANTS) LONG-TERM USE: ICD-10-CM

## 2017-09-26 DIAGNOSIS — M25.9 MULTIPLE JOINT COMPLAINTS: Primary | ICD-10-CM

## 2017-09-26 DIAGNOSIS — M06.9 RHEUMATOID ARTHRITIS INVOLVING MULTIPLE JOINTS (HCC): ICD-10-CM

## 2017-09-26 PROCEDURE — 99213 OFFICE O/P EST LOW 20 MIN: CPT | Performed by: PAIN MEDICINE

## 2017-09-26 RX ORDER — HYDROCODONE BITARTRATE AND ACETAMINOPHEN 10; 325 MG/1; MG/1
1 TABLET ORAL
Qty: 150 TABLET | Refills: 0 | Status: SHIPPED | OUTPATIENT
Start: 2017-09-26 | End: 2017-10-26

## 2017-09-26 NOTE — PROGRESS NOTES
Neetu Howe is a 67 y.o. female.   1950    HPI:   Location: Joints  Quality: sharp  Severity: 10/10  Timing: constant  Alleviating: pain medication  Aggravating: increased activity    No changes to her rheumatoid treatment.  Her rheumatologist is retiring this month and will be seeing a new one.  Her opioid medication continues to provide relief and allows her to be more active than she would be otherwise.  She denies side effects of this medication.    The following portions of the patient's history were reviewed by me and updated as appropriate: allergies, current medications, past family history, past medical history, past social history, past surgical history and problem list.    Past Medical History:   Diagnosis Date   • Arthritis    • Benign essential hypertension    • Borderline glaucoma    • Drug therapy     Long-term drug therapy plaquenil, no adverse effects      • Encounter for long-term (current) drug use     Other long term (current) drug therapy      • Epiretinal membrane     OD   • Fibromyositis    • Joint pain    • Nuclear cataract     refractive change OD      • Rheumatoid arthritis    • Rheumatoid arthritis        Social History     Social History   • Marital status: Single     Spouse name: N/A   • Number of children: N/A   • Years of education: N/A     Occupational History   • Not on file.     Social History Main Topics   • Smoking status: Former Smoker   • Smokeless tobacco: Never Used   • Alcohol use No   • Drug use: No   • Sexual activity: No     Other Topics Concern   • Not on file     Social History Narrative       Family History   Problem Relation Age of Onset   • Asthma Mother    • Cancer Father      Other   • Hyperlipidemia Father    • Hypertension Father          Current Outpatient Prescriptions:   •  Ascorbic Acid (VITAMIN C) 500 MG chewable tablet, Chew 1 tablet daily., Disp: , Rfl:   •  B Complex-Folic Acid (B COMPLEX-VITAMIN B12 PO), Take 0.5 tablets by mouth daily., Disp:  , Rfl:   •  Calcium & Magnesium Carbonates (MYLANTA PO), Take 1 tablet by mouth daily as needed. 1 tablet, chewable every day Oral PRN, Disp: , Rfl:   •  Certolizumab Pegol (CIMZIA PREFILLED SC), Inject  under the skin., Disp: , Rfl:   •  Certolizumab Pegol 2 X 200 MG kit, Inject 400 mg under the skin., Disp: , Rfl:   •  esomeprazole (NexIUM) 40 MG capsule, Take 40 mg by mouth daily., Disp: , Rfl:   •  estradiol (ESTRACE VAGINAL) 0.1 MG/GM vaginal cream, Insert 4 gm daily x 2 wks, then 2 gm daily x 2 wks, then 1 gm twice weekly for maintenance., Disp: 42.5 g, Rfl: 12  •  ferrous sulfate 325 (65 FE) MG tablet, Take 325 mg by mouth daily., Disp: , Rfl:   •  flintstones complete (FLINTSTONES) 60 MG chewable tablet, Chew., Disp: , Rfl:   •  FLUZONE HIGH-DOSE 0.5 ML suspension prefilled syringe injection, ADM 0.5ML IM UTD, Disp: , Rfl: 0  •  FOLIC ACID PO, Take 1 tablet by mouth daily., Disp: , Rfl:   •  Hydrocodone-Acetaminophen (LORTAB )  MG per tablet, Take 1 tablet by mouth every 4 (four) hours as needed for moderate pain (4-6)., Disp: , Rfl:   •  HYDROcodone-acetaminophen (NORCO)  MG per tablet, TK 1 T PO  QID, Disp: , Rfl: 0  •  HYDROcodone-acetaminophen (NORCO)  MG per tablet, Take 1 tablet by mouth Every 6 (Six) Hours., Disp: , Rfl:   •  hydroxychloroquine (PLAQUENIL) 200 MG tablet, Take 300 mg by mouth Daily. 1.0 tablet every day Oral, Disp: , Rfl:   •  hydroxychloroquine (PLAQUENIL) 200 MG tablet, TAKE 1 AND 1/2 TABLET BY MOUTH EVERY DAY, Disp: , Rfl:   •  lactulose (CHRONULAC) 10 GM/15ML solution, Take 30 g by mouth 3 (three) times a day., Disp: , Rfl:   •  lisinopril-hydrochlorothiazide (PRINZIDE,ZESTORETIC) 20-12.5 MG per tablet, TK 1 T PO D, Disp: , Rfl: 0  •  lisinopril-hydrochlorothiazide (PRINZIDE,ZESTORETIC) 20-12.5 MG per tablet, Take 1 tablet by mouth., Disp: , Rfl:   •  LISINOPRIL-HYDROCHLOROTHIAZIDE PO, Take 1 tablet by mouth daily., Disp: , Rfl:   •  omeprazole (priLOSEC) 40  MG capsule, TK 1 C PO D, Disp: , Rfl: 0  •  omeprazole (priLOSEC) 40 MG capsule, Take 40 mg by mouth., Disp: , Rfl:   •  Ospemifene (OSPHENA) 60 MG tablet, Take 1 tablet by mouth Daily., Disp: 30 tablet, Rfl: 11  •  Ospemifene 60 MG tablet, Take 60 mg by mouth Daily., Disp: 90 tablet, Rfl: 0  •  oxybutynin (DITROPAN) 5 MG tablet, Take 5 mg by mouth 2 (two) times a day., Disp: , Rfl:   •  oxybutynin (DITROPAN) 5 MG tablet, Take 5 mg by mouth., Disp: , Rfl:   •  Polyethylene Glycol 3350 (MIRALAX PO), Take  by mouth daily. Miralax 17 gram/dose Oral Powder, Disp: , Rfl:   •  polyethylene glycol pack, Take 1 packet by mouth., Disp: , Rfl:   •  predniSONE (DELTASONE) 5 MG tablet, TK 2 TS PO D, Disp: , Rfl: 1  •  Probiotic Product ([employer] ADULT PROBIOTIC PO), Take 1 each by mouth., Disp: , Rfl:   •  ranitidine (ZANTAC) 150 MG tablet, Take 150 mg by mouth daily as needed for heartburn., Disp: , Rfl:   •  simethicone (MYLICON) 80 MG chewable tablet, Chew 160 mg daily as needed., Disp: , Rfl:   •  HYDROcodone-acetaminophen (NORCO)  MG per tablet, Take 1 tablet by mouth 5 (Five) Times a Day for 30 days., Disp: 150 tablet, Rfl: 0  •  HYDROcodone-acetaminophen (NORCO)  MG per tablet, Take 1 tablet by mouth 5 (Five) Times a Day for 30 days., Disp: 150 tablet, Rfl: 0    Allergies   Allergen Reactions   • Sulfa Antibiotics Nausea Only     Intense nausa   • Doxycycline      Body movements  Body movements   • Nitrofurantoin      Makes her hurt all over.  Tendons felt like being tore  Makes her hurt all over.  Tendons felt like being tore   • Ciprofloxacin Nausea Only       Review of Systems   Musculoskeletal: Positive for joint swelling (all over joint pain).   All other systems reviewed and are negative.    All systems reviewed and negative except for above.    Physical Exam   Constitutional: She appears well-developed and well-nourished. No distress.   Musculoskeletal:   Obvious rheumatoid changes to hands.        Neurological: She is alert.   Electric scooter   Psychiatric: She has a normal mood and affect. Her behavior is normal. Judgment normal.       Neetu was seen today for joint pain.    Diagnoses and all orders for this visit:    Multiple joint complaints    Rheumatoid arthritis involving multiple joints    High risk medications (not anticoagulants) long-term use    Other orders  -     HYDROcodone-acetaminophen (NORCO)  MG per tablet; Take 1 tablet by mouth 5 (Five) Times a Day for 30 days.  -     HYDROcodone-acetaminophen (NORCO)  MG per tablet; Take 1 tablet by mouth 5 (Five) Times a Day for 30 days.      Medication: Patient reports no negative side effects, Patient reports appropriate usage and storage habits, Patient's opioid provides enough relief to be more active and perform activities of daily living with less discomfort. and Refill opioid medication as above    Interventional: none at this time    Rehab: none at this time    Behavioral: No aberrant behavior noted. [facility] Report #[id number]  was reviewed and is consistent with stated history    Urine drug screen None at this time          This document has been electronically signed by Cristopher Aguilar MD on September 26, 2017 7:51 AM

## 2017-10-13 ENCOUNTER — OFFICE VISIT (OUTPATIENT)
Dept: ORTHOPEDIC SURGERY | Facility: CLINIC | Age: 67
End: 2017-10-13

## 2017-10-13 VITALS — WEIGHT: 164 LBS | BODY MASS INDEX: 28 KG/M2 | HEIGHT: 64 IN

## 2017-10-13 DIAGNOSIS — M25.511 ACUTE PAIN OF RIGHT SHOULDER: Primary | ICD-10-CM

## 2017-10-13 DIAGNOSIS — M77.11 LATERAL EPICONDYLITIS OF RIGHT ELBOW: ICD-10-CM

## 2017-10-13 DIAGNOSIS — M25.521 RIGHT ELBOW PAIN: ICD-10-CM

## 2017-10-13 PROCEDURE — 99202 OFFICE O/P NEW SF 15 MIN: CPT | Performed by: ORTHOPAEDIC SURGERY

## 2017-10-13 NOTE — PROGRESS NOTES
Neetu Howe is a 67 y.o. female   Primary provider:  Elissa Fernandes MD       Chief Complaint   Patient presents with   • Right Shoulder - Pain   • Right Elbow - Pain   • Results     xrays done today       HISTORY OF PRESENT ILLNESS: Patient is having pain from the shoulder to the elbow.    Pain   This is a new problem. The current episode started 1 to 4 weeks ago. The problem occurs constantly. The problem has been unchanged. Associated symptoms comments: Sharp pain with stabbing sensation. Nothing aggravates the symptoms. She has tried nothing for the symptoms.        CONCURRENT MEDICAL HISTORY:    Past Medical History:   Diagnosis Date   • Arthritis    • Benign essential hypertension    • Borderline glaucoma    • Drug therapy     Long-term drug therapy plaquenil, no adverse effects      • Encounter for long-term (current) drug use     Other long term (current) drug therapy      • Epiretinal membrane     OD   • Fibromyositis    • Joint pain    • Nuclear cataract     refractive change OD      • Rheumatoid arthritis    • Rheumatoid arthritis        Allergies   Allergen Reactions   • Sulfa Antibiotics Nausea Only     Intense nausa   • Doxycycline      Body movements  Body movements   • Nitrofurantoin      Makes her hurt all over.  Tendons felt like being tore  Makes her hurt all over.  Tendons felt like being tore   • Ciprofloxacin Nausea Only         Current Outpatient Prescriptions:   •  Ascorbic Acid (VITAMIN C) 500 MG chewable tablet, Chew 1 tablet daily., Disp: , Rfl:   •  B Complex-Folic Acid (B COMPLEX-VITAMIN B12 PO), Take 0.5 tablets by mouth daily., Disp: , Rfl:   •  Calcium & Magnesium Carbonates (MYLANTA PO), Take 1 tablet by mouth daily as needed. 1 tablet, chewable every day Oral PRN, Disp: , Rfl:   •  Certolizumab Pegol (CIMZIA PREFILLED SC), Inject  under the skin., Disp: , Rfl:   •  Certolizumab Pegol 2 X 200 MG kit, Inject 400 mg under the skin., Disp: , Rfl:   •  esomeprazole (NexIUM)  40 MG capsule, Take 40 mg by mouth daily., Disp: , Rfl:   •  estradiol (ESTRACE VAGINAL) 0.1 MG/GM vaginal cream, Insert 4 gm daily x 2 wks, then 2 gm daily x 2 wks, then 1 gm twice weekly for maintenance., Disp: 42.5 g, Rfl: 12  •  ferrous sulfate 325 (65 FE) MG tablet, Take 325 mg by mouth daily., Disp: , Rfl:   •  flintstones complete (FLINTSTONES) 60 MG chewable tablet, Chew., Disp: , Rfl:   •  FLUZONE HIGH-DOSE 0.5 ML suspension prefilled syringe injection, ADM 0.5ML IM UTD, Disp: , Rfl: 0  •  FOLIC ACID PO, Take 1 tablet by mouth daily., Disp: , Rfl:   •  Hydrocodone-Acetaminophen (LORTAB )  MG per tablet, Take 1 tablet by mouth every 4 (four) hours as needed for moderate pain (4-6)., Disp: , Rfl:   •  HYDROcodone-acetaminophen (NORCO)  MG per tablet, TK 1 T PO  QID, Disp: , Rfl: 0  •  HYDROcodone-acetaminophen (NORCO)  MG per tablet, Take 1 tablet by mouth Every 6 (Six) Hours., Disp: , Rfl:   •  HYDROcodone-acetaminophen (NORCO)  MG per tablet, Take 1 tablet by mouth 5 (Five) Times a Day for 30 days., Disp: 150 tablet, Rfl: 0  •  HYDROcodone-acetaminophen (NORCO)  MG per tablet, Take 1 tablet by mouth 5 (Five) Times a Day for 30 days., Disp: 150 tablet, Rfl: 0  •  hydroxychloroquine (PLAQUENIL) 200 MG tablet, Take 300 mg by mouth Daily. 1.0 tablet every day Oral, Disp: , Rfl:   •  hydroxychloroquine (PLAQUENIL) 200 MG tablet, TAKE 1 AND 1/2 TABLET BY MOUTH EVERY DAY, Disp: , Rfl:   •  lactulose (CHRONULAC) 10 GM/15ML solution, Take 30 g by mouth 3 (three) times a day., Disp: , Rfl:   •  lisinopril-hydrochlorothiazide (PRINZIDE,ZESTORETIC) 20-12.5 MG per tablet, TK 1 T PO D, Disp: , Rfl: 0  •  lisinopril-hydrochlorothiazide (PRINZIDE,ZESTORETIC) 20-12.5 MG per tablet, Take 1 tablet by mouth., Disp: , Rfl:   •  LISINOPRIL-HYDROCHLOROTHIAZIDE PO, Take 1 tablet by mouth daily., Disp: , Rfl:   •  omeprazole (priLOSEC) 40 MG capsule, TK 1 C PO D, Disp: , Rfl: 0  •  omeprazole  "(priLOSEC) 40 MG capsule, Take 40 mg by mouth., Disp: , Rfl:   •  Ospemifene (OSPHENA) 60 MG tablet, Take 1 tablet by mouth Daily., Disp: 30 tablet, Rfl: 11  •  Ospemifene 60 MG tablet, Take 60 mg by mouth Daily., Disp: 90 tablet, Rfl: 0  •  oxybutynin (DITROPAN) 5 MG tablet, Take 5 mg by mouth 2 (two) times a day., Disp: , Rfl:   •  oxybutynin (DITROPAN) 5 MG tablet, Take 5 mg by mouth., Disp: , Rfl:   •  Polyethylene Glycol 3350 (MIRALAX PO), Take  by mouth daily. Miralax 17 gram/dose Oral Powder, Disp: , Rfl:   •  polyethylene glycol pack, Take 1 packet by mouth., Disp: , Rfl:   •  predniSONE (DELTASONE) 5 MG tablet, TK 2 TS PO D, Disp: , Rfl: 1  •  Probiotic Product ([employer] ADULT PROBIOTIC PO), Take 1 each by mouth., Disp: , Rfl:   •  ranitidine (ZANTAC) 150 MG tablet, Take 150 mg by mouth daily as needed for heartburn., Disp: , Rfl:   •  simethicone (MYLICON) 80 MG chewable tablet, Chew 160 mg daily as needed., Disp: , Rfl:     No past surgical history on file.    Family History   Problem Relation Age of Onset   • Asthma Mother    • Cancer Father      Other   • Hyperlipidemia Father    • Hypertension Father         Social History     Social History   • Marital status: Single     Spouse name: N/A   • Number of children: N/A   • Years of education: N/A     Occupational History   • Not on file.     Social History Main Topics   • Smoking status: Former Smoker   • Smokeless tobacco: Never Used   • Alcohol use No   • Drug use: No   • Sexual activity: No     Other Topics Concern   • Not on file     Social History Narrative        Review of Systems   All other systems reviewed and are negative.      PHYSICAL EXAMINATION:       Ht 64\" (162.6 cm)  Wt 164 lb (74.4 kg)  LMP  (LMP Unknown)  BMI 28.15 kg/m2    Physical Exam    GAIT:     []  Normal  []  Antalgic    Assistive device: []  None  []  Walker     []  Crutches  []  Cane     []  Wheelchair  []  Stretcher    Ortho Exam    Pain on palpation of the lateral epicondyle of " the rt elbow. Neuro intact. Pain on pronation and supination of the elbow.      No results found.        ASSESSMENT:    Diagnoses and all orders for this visit:    Acute pain of right shoulder  -     XR Shoulder 2+ View Right    Right elbow pain  -     XR Elbow 3+ View Right    Lateral epicondylitis of right elbow          PLAN  Tennis elbow brace, continue with medrol dose pack and see in 4 weeks.      No Follow-up on file.    Cory Francis MD

## 2017-11-21 ENCOUNTER — OFFICE VISIT (OUTPATIENT)
Dept: PAIN MEDICINE | Facility: CLINIC | Age: 67
End: 2017-11-21

## 2017-11-21 VITALS
BODY MASS INDEX: 27.69 KG/M2 | WEIGHT: 162.2 LBS | DIASTOLIC BLOOD PRESSURE: 72 MMHG | HEIGHT: 64 IN | SYSTOLIC BLOOD PRESSURE: 122 MMHG

## 2017-11-21 DIAGNOSIS — M25.9 MULTIPLE JOINT COMPLAINTS: Primary | ICD-10-CM

## 2017-11-21 DIAGNOSIS — M06.9 RHEUMATOID ARTHRITIS INVOLVING MULTIPLE JOINTS (HCC): ICD-10-CM

## 2017-11-21 DIAGNOSIS — Z79.899 HIGH RISK MEDICATIONS (NOT ANTICOAGULANTS) LONG-TERM USE: ICD-10-CM

## 2017-11-21 PROCEDURE — 99213 OFFICE O/P EST LOW 20 MIN: CPT | Performed by: PAIN MEDICINE

## 2017-11-21 RX ORDER — HYDROCODONE BITARTRATE AND ACETAMINOPHEN 10; 325 MG/1; MG/1
1 TABLET ORAL
Qty: 150 TABLET | Refills: 0 | Status: SHIPPED | OUTPATIENT
Start: 2017-11-21 | End: 2017-12-21

## 2017-11-21 NOTE — PROGRESS NOTES
Neetu Howe is a 67 y.o. female.   1950    HPI:   Location: Joints  Quality: sharp  Severity: 7/10  Timing: constant  Alleviating: pain medication  Aggravating: increased activity     Patient denies side effects, she reports that her opioid medication still provides significant relief and allows her to be more active.        The following portions of the patient's history were reviewed by me and updated as appropriate: allergies, current medications, past family history, past medical history, past social history, past surgical history and problem list.    Past Medical History:   Diagnosis Date   • Arthritis    • Benign essential hypertension    • Borderline glaucoma    • Drug therapy     Long-term drug therapy plaquenil, no adverse effects      • Encounter for long-term (current) drug use     Other long term (current) drug therapy      • Epiretinal membrane     OD   • Fibromyositis    • Joint pain    • Nuclear cataract     refractive change OD      • Rheumatoid arthritis    • Rheumatoid arthritis        Social History     Social History   • Marital status: Single     Spouse name: N/A   • Number of children: N/A   • Years of education: N/A     Occupational History   • Not on file.     Social History Main Topics   • Smoking status: Former Smoker   • Smokeless tobacco: Never Used   • Alcohol use No   • Drug use: No   • Sexual activity: No     Other Topics Concern   • Not on file     Social History Narrative       Family History   Problem Relation Age of Onset   • Asthma Mother    • Cancer Father      Other   • Hyperlipidemia Father    • Hypertension Father          Current Outpatient Prescriptions:   •  Ascorbic Acid (VITAMIN C) 500 MG chewable tablet, Chew 1 tablet daily., Disp: , Rfl:   •  B Complex-Folic Acid (B COMPLEX-VITAMIN B12 PO), Take 0.5 tablets by mouth daily., Disp: , Rfl:   •  Calcium & Magnesium Carbonates (MYLANTA PO), Take 1 tablet by mouth daily as needed. 1 tablet, chewable every day Oral  PRN, Disp: , Rfl:   •  Certolizumab Pegol (CIMZIA PREFILLED SC), Inject  under the skin., Disp: , Rfl:   •  Certolizumab Pegol 2 X 200 MG kit, Inject 400 mg under the skin., Disp: , Rfl:   •  esomeprazole (NexIUM) 40 MG capsule, Take 40 mg by mouth daily., Disp: , Rfl:   •  estradiol (ESTRACE VAGINAL) 0.1 MG/GM vaginal cream, Insert 4 gm daily x 2 wks, then 2 gm daily x 2 wks, then 1 gm twice weekly for maintenance., Disp: 42.5 g, Rfl: 12  •  ferrous sulfate 325 (65 FE) MG tablet, Take 325 mg by mouth daily., Disp: , Rfl:   •  flintstones complete (FLINTSTONES) 60 MG chewable tablet, Chew., Disp: , Rfl:   •  FLUZONE HIGH-DOSE 0.5 ML suspension prefilled syringe injection, ADM 0.5ML IM UTD, Disp: , Rfl: 0  •  FOLIC ACID PO, Take 1 tablet by mouth daily., Disp: , Rfl:   •  Hydrocodone-Acetaminophen (LORTAB )  MG per tablet, Take 1 tablet by mouth every 4 (four) hours as needed for moderate pain (4-6)., Disp: , Rfl:   •  HYDROcodone-acetaminophen (NORCO)  MG per tablet, TK 1 T PO  QID, Disp: , Rfl: 0  •  HYDROcodone-acetaminophen (NORCO)  MG per tablet, Take 1 tablet by mouth Every 6 (Six) Hours., Disp: , Rfl:   •  hydroxychloroquine (PLAQUENIL) 200 MG tablet, Take 300 mg by mouth Daily. 1.0 tablet every day Oral, Disp: , Rfl:   •  hydroxychloroquine (PLAQUENIL) 200 MG tablet, TAKE 1 AND 1/2 TABLET BY MOUTH EVERY DAY, Disp: , Rfl:   •  lactulose (CHRONULAC) 10 GM/15ML solution, Take 30 g by mouth 3 (three) times a day., Disp: , Rfl:   •  lisinopril-hydrochlorothiazide (PRINZIDE,ZESTORETIC) 20-12.5 MG per tablet, TK 1 T PO D, Disp: , Rfl: 0  •  lisinopril-hydrochlorothiazide (PRINZIDE,ZESTORETIC) 20-12.5 MG per tablet, Take 1 tablet by mouth., Disp: , Rfl:   •  LISINOPRIL-HYDROCHLOROTHIAZIDE PO, Take 1 tablet by mouth daily., Disp: , Rfl:   •  omeprazole (priLOSEC) 40 MG capsule, TK 1 C PO D, Disp: , Rfl: 0  •  omeprazole (priLOSEC) 40 MG capsule, Take 40 mg by mouth., Disp: , Rfl:   •   Ospemifene (OSPHENA) 60 MG tablet, Take 1 tablet by mouth Daily., Disp: 30 tablet, Rfl: 11  •  Ospemifene 60 MG tablet, Take 60 mg by mouth Daily., Disp: 90 tablet, Rfl: 0  •  oxybutynin (DITROPAN) 5 MG tablet, Take 5 mg by mouth 2 (two) times a day., Disp: , Rfl:   •  oxybutynin (DITROPAN) 5 MG tablet, Take 5 mg by mouth., Disp: , Rfl:   •  Polyethylene Glycol 3350 (MIRALAX PO), Take  by mouth daily. Miralax 17 gram/dose Oral Powder, Disp: , Rfl:   •  polyethylene glycol pack, Take 1 packet by mouth., Disp: , Rfl:   •  predniSONE (DELTASONE) 5 MG tablet, TK 2 TS PO D, Disp: , Rfl: 1  •  Probiotic Product ([employer] ADULT PROBIOTIC PO), Take 1 each by mouth., Disp: , Rfl:   •  ranitidine (ZANTAC) 150 MG tablet, Take 150 mg by mouth daily as needed for heartburn., Disp: , Rfl:   •  simethicone (MYLICON) 80 MG chewable tablet, Chew 160 mg daily as needed., Disp: , Rfl:   •  HYDROcodone-acetaminophen (NORCO)  MG per tablet, Take 1 tablet by mouth 5 (Five) Times a Day for 30 days., Disp: 150 tablet, Rfl: 0  •  HYDROcodone-acetaminophen (NORCO)  MG per tablet, Take 1 tablet by mouth 5 (Five) Times a Day for 30 days., Disp: 150 tablet, Rfl: 0    Allergies   Allergen Reactions   • Sulfa Antibiotics Nausea Only     Intense nausa   • Doxycycline      Body movements  Body movements   • Nitrofurantoin      Makes her hurt all over.  Tendons felt like being tore  Makes her hurt all over.  Tendons felt like being tore   • Ciprofloxacin Nausea Only       Review of Systems   Musculoskeletal:        Multiple joint pain   All other systems reviewed and are negative.    All systems reviewed and negative except for above.    Physical Exam   Constitutional: She appears well-developed and well-nourished. No distress.   Musculoskeletal:   Obvious rheumatoid changes to hands.       Neurological: She is alert.   Electric scooter   Psychiatric: She has a normal mood and affect. Her behavior is normal. Judgment normal.       Neetu was  seen today for joint pain.    Diagnoses and all orders for this visit:    Multiple joint complaints    Rheumatoid arthritis involving multiple joints    High risk medications (not anticoagulants) long-term use    Other orders  -     HYDROcodone-acetaminophen (NORCO)  MG per tablet; Take 1 tablet by mouth 5 (Five) Times a Day for 30 days.  -     HYDROcodone-acetaminophen (NORCO)  MG per tablet; Take 1 tablet by mouth 5 (Five) Times a Day for 30 days.        Medication: Patient reports no negative side effects, Patient reports appropriate usage and storage habits, Patient's opioid provides enough relief to be more active and perform activities of daily living with less discomfort. and Refill opioid medication as above.   I explained that the pain clinic will be closing after the first of the year.  I suggested pt discuss what to do next with pcp and that pt may call back in about a month to find out if there is going to be a new clinic in the area.       Interventional: none at this time    Rehab: none at this time    Behavioral: No aberrant behavior noted. [facility] Report #[id number]  was reviewed and is consistent with stated history    Urine drug screen None at this time          This document has been electronically signed by Cristopher Aguilar MD on November 21, 2017 8:17 AM

## 2018-05-21 ENCOUNTER — LAB REQUISITION (OUTPATIENT)
Dept: LAB | Facility: HOSPITAL | Age: 68
End: 2018-05-21

## 2018-05-21 ENCOUNTER — HOSPITAL ENCOUNTER (OUTPATIENT)
Dept: GENERAL RADIOLOGY | Facility: HOSPITAL | Age: 68
Discharge: HOME | End: 2018-05-21

## 2018-05-21 ENCOUNTER — HOSPITAL ENCOUNTER (OUTPATIENT)
Dept: GENERAL RADIOLOGY | Facility: HOSPITAL | Age: 68
Discharge: HOME | End: 2018-05-21
Admitting: NURSE PRACTITIONER

## 2018-05-21 ENCOUNTER — OFFICE VISIT (OUTPATIENT)
Dept: WOUND CARE | Facility: HOSPITAL | Age: 68
End: 2018-05-21

## 2018-05-21 DIAGNOSIS — L89.899 DECUBITUS ULCER OF LEFT FOOT, UNSPECIFIED ULCER STAGE: ICD-10-CM

## 2018-05-21 DIAGNOSIS — L89.899 DECUBITUS ULCER OF RIGHT FOOT, UNSPECIFIED ULCER STAGE: ICD-10-CM

## 2018-05-21 DIAGNOSIS — S91.105A: ICD-10-CM

## 2018-05-21 LAB
ALBUMIN SERPL-MCNC: 3.9 G/DL (ref 3.4–4.8)
ALBUMIN/GLOB SERPL: 1.2 G/DL (ref 1.1–1.8)
ALP SERPL-CCNC: 109 U/L (ref 38–126)
ALT SERPL W P-5'-P-CCNC: 29 U/L (ref 9–52)
ANION GAP SERPL CALCULATED.3IONS-SCNC: 11 MMOL/L (ref 5–15)
AST SERPL-CCNC: 36 U/L (ref 14–36)
BASOPHILS # BLD AUTO: 0.01 10*3/MM3 (ref 0–0.2)
BASOPHILS NFR BLD AUTO: 0.1 % (ref 0–2)
BILIRUB SERPL-MCNC: 0.8 MG/DL (ref 0.2–1.3)
BUN BLD-MCNC: 8 MG/DL (ref 7–21)
BUN/CREAT SERPL: 22.2 (ref 7–25)
CALCIUM SPEC-SCNC: 9.4 MG/DL (ref 8.4–10.2)
CHLORIDE SERPL-SCNC: 94 MMOL/L (ref 95–110)
CO2 SERPL-SCNC: 28 MMOL/L (ref 22–31)
CREAT BLD-MCNC: 0.36 MG/DL (ref 0.5–1)
CRP SERPL-MCNC: 4.4 MG/DL (ref 0–1)
DEPRECATED RDW RBC AUTO: 45.5 FL (ref 36.4–46.3)
EOSINOPHIL # BLD AUTO: 0.01 10*3/MM3 (ref 0–0.7)
EOSINOPHIL NFR BLD AUTO: 0.1 % (ref 0–7)
ERYTHROCYTE [DISTWIDTH] IN BLOOD BY AUTOMATED COUNT: 13.8 % (ref 11.5–14.5)
ERYTHROCYTE [SEDIMENTATION RATE] IN BLOOD: 35 MM/HR (ref 0–20)
GFR SERPL CREATININE-BSD FRML MDRD: 180 ML/MIN/1.73 (ref 45–104)
GLOBULIN UR ELPH-MCNC: 3.2 GM/DL (ref 2.3–3.5)
GLUCOSE BLD-MCNC: 87 MG/DL (ref 60–100)
HBA1C MFR BLD: 5 % (ref 4–5.6)
HCT VFR BLD AUTO: 35.8 % (ref 35–45)
HGB BLD-MCNC: 12.1 G/DL (ref 12–15.5)
IMM GRANULOCYTES # BLD: 0.02 10*3/MM3 (ref 0–0.02)
IMM GRANULOCYTES NFR BLD: 0.2 % (ref 0–0.5)
LYMPHOCYTES # BLD AUTO: 0.59 10*3/MM3 (ref 0.6–4.2)
LYMPHOCYTES NFR BLD AUTO: 7.2 % (ref 10–50)
MCH RBC QN AUTO: 30.6 PG (ref 26.5–34)
MCHC RBC AUTO-ENTMCNC: 33.8 G/DL (ref 31.4–36)
MCV RBC AUTO: 90.6 FL (ref 80–98)
MONOCYTES # BLD AUTO: 0.52 10*3/MM3 (ref 0–0.9)
MONOCYTES NFR BLD AUTO: 6.3 % (ref 0–12)
NEUTROPHILS # BLD AUTO: 7.08 10*3/MM3 (ref 2–8.6)
NEUTROPHILS NFR BLD AUTO: 86.1 % (ref 37–80)
NRBC BLD MANUAL-RTO: 0 /100 WBC (ref 0–0)
PLATELET # BLD AUTO: 214 10*3/MM3 (ref 150–450)
PMV BLD AUTO: 9.2 FL (ref 8–12)
POTASSIUM BLD-SCNC: 4 MMOL/L (ref 3.5–5.1)
PROT SERPL-MCNC: 7.1 G/DL (ref 6.3–8.6)
RBC # BLD AUTO: 3.95 10*6/MM3 (ref 3.77–5.16)
SODIUM BLD-SCNC: 133 MMOL/L (ref 137–145)
WBC NRBC COR # BLD: 8.23 10*3/MM3 (ref 3.2–9.8)

## 2018-05-21 PROCEDURE — 86140 C-REACTIVE PROTEIN: CPT | Performed by: NURSE PRACTITIONER

## 2018-05-21 PROCEDURE — 85025 COMPLETE CBC W/AUTO DIFF WBC: CPT | Performed by: NURSE PRACTITIONER

## 2018-05-21 PROCEDURE — 36415 COLL VENOUS BLD VENIPUNCTURE: CPT | Performed by: NURSE PRACTITIONER

## 2018-05-21 PROCEDURE — 85651 RBC SED RATE NONAUTOMATED: CPT | Performed by: NURSE PRACTITIONER

## 2018-05-21 PROCEDURE — 80053 COMPREHEN METABOLIC PANEL: CPT | Performed by: NURSE PRACTITIONER

## 2018-05-21 PROCEDURE — 73630 X-RAY EXAM OF FOOT: CPT

## 2018-05-21 PROCEDURE — 83036 HEMOGLOBIN GLYCOSYLATED A1C: CPT | Performed by: NURSE PRACTITIONER

## 2018-05-21 PROCEDURE — G0463 HOSPITAL OUTPT CLINIC VISIT: HCPCS

## 2018-05-24 ENCOUNTER — HOSPITAL ENCOUNTER (EMERGENCY)
Facility: HOSPITAL | Age: 68
Discharge: HOME | End: 2018-05-24
Attending: EMERGENCY MEDICINE

## 2018-05-24 ENCOUNTER — APPOINTMENT (OUTPATIENT)
Dept: GENERAL RADIOLOGY | Facility: HOSPITAL | Age: 68
End: 2018-05-24

## 2018-05-24 ENCOUNTER — HOSPITAL ENCOUNTER (OUTPATIENT)
Facility: HOSPITAL | Age: 68
Setting detail: OBSERVATION
Discharge: HOME | End: 2018-05-26
Attending: EMERGENCY MEDICINE | Admitting: INTERNAL MEDICINE

## 2018-05-24 VITALS
DIASTOLIC BLOOD PRESSURE: 79 MMHG | SYSTOLIC BLOOD PRESSURE: 160 MMHG | WEIGHT: 150 LBS | HEART RATE: 70 BPM | HEIGHT: 60 IN | BODY MASS INDEX: 29.45 KG/M2 | RESPIRATION RATE: 18 BRPM | TEMPERATURE: 97.5 F | OXYGEN SATURATION: 98 %

## 2018-05-24 DIAGNOSIS — M79.642 BILATERAL HAND PAIN: ICD-10-CM

## 2018-05-24 DIAGNOSIS — M25.521 RIGHT ELBOW PAIN: ICD-10-CM

## 2018-05-24 DIAGNOSIS — K80.20 CALCULUS OF GALLBLADDER WITHOUT CHOLECYSTITIS WITHOUT OBSTRUCTION: ICD-10-CM

## 2018-05-24 DIAGNOSIS — M79.641 BILATERAL HAND PAIN: ICD-10-CM

## 2018-05-24 DIAGNOSIS — M54.50 LEFT-SIDED LOW BACK PAIN WITHOUT SCIATICA, UNSPECIFIED CHRONICITY: Primary | ICD-10-CM

## 2018-05-24 DIAGNOSIS — M25.9 MULTIPLE JOINT COMPLAINTS: ICD-10-CM

## 2018-05-24 DIAGNOSIS — M06.9 RHEUMATOID ARTHRITIS INVOLVING MULTIPLE JOINTS (HCC): ICD-10-CM

## 2018-05-24 DIAGNOSIS — Z79.891 LONG TERM (CURRENT) USE OF OPIATE ANALGESIC: ICD-10-CM

## 2018-05-24 DIAGNOSIS — K44.9 HIATAL HERNIA: ICD-10-CM

## 2018-05-24 DIAGNOSIS — M47.816 SPONDYLOSIS OF LUMBAR REGION WITHOUT MYELOPATHY OR RADICULOPATHY: ICD-10-CM

## 2018-05-24 DIAGNOSIS — M77.11 LATERAL EPICONDYLITIS OF RIGHT ELBOW: ICD-10-CM

## 2018-05-24 DIAGNOSIS — E87.1 HYPONATREMIA: Primary | ICD-10-CM

## 2018-05-24 DIAGNOSIS — M53.9 MULTILEVEL DEGENERATIVE DISC DISEASE: ICD-10-CM

## 2018-05-24 DIAGNOSIS — M25.511 ACUTE PAIN OF RIGHT SHOULDER: ICD-10-CM

## 2018-05-24 LAB
BILIRUB UR QL STRIP: NEGATIVE
CLARITY UR: CLEAR
COLOR UR: YELLOW
GLUCOSE UR STRIP-MCNC: NEGATIVE MG/DL
HGB UR QL STRIP.AUTO: NEGATIVE
KETONES UR QL STRIP: NEGATIVE
LEUKOCYTE ESTERASE UR QL STRIP.AUTO: NEGATIVE
NITRITE UR QL STRIP: NEGATIVE
PH UR STRIP.AUTO: 6.5 [PH] (ref 5–9)
PROT UR QL STRIP: NEGATIVE
SP GR UR STRIP: 1.02 (ref 1–1.03)
UROBILINOGEN UR QL STRIP: NORMAL

## 2018-05-24 PROCEDURE — 99284 EMERGENCY DEPT VISIT MOD MDM: CPT

## 2018-05-24 PROCEDURE — 72100 X-RAY EXAM L-S SPINE 2/3 VWS: CPT

## 2018-05-24 PROCEDURE — 96372 THER/PROPH/DIAG INJ SC/IM: CPT

## 2018-05-24 PROCEDURE — 25010000002 HYDROMORPHONE PER 4 MG: Performed by: EMERGENCY MEDICINE

## 2018-05-24 PROCEDURE — 81003 URINALYSIS AUTO W/O SCOPE: CPT | Performed by: EMERGENCY MEDICINE

## 2018-05-24 PROCEDURE — 36415 COLL VENOUS BLD VENIPUNCTURE: CPT

## 2018-05-24 PROCEDURE — 25010000002 PROMETHAZINE PER 50 MG: Performed by: EMERGENCY MEDICINE

## 2018-05-24 RX ORDER — PROMETHAZINE HYDROCHLORIDE 25 MG/ML
12.5 INJECTION, SOLUTION INTRAMUSCULAR; INTRAVENOUS ONCE
Status: COMPLETED | OUTPATIENT
Start: 2018-05-24 | End: 2018-05-24

## 2018-05-24 RX ORDER — OXYCODONE AND ACETAMINOPHEN 7.5; 325 MG/1; MG/1
1 TABLET ORAL EVERY 6 HOURS PRN
Qty: 15 TABLET | Refills: 0 | Status: SHIPPED | OUTPATIENT
Start: 2018-05-24 | End: 2018-06-13

## 2018-05-24 RX ORDER — SODIUM CHLORIDE 0.9 % (FLUSH) 0.9 %
10 SYRINGE (ML) INJECTION AS NEEDED
Status: DISCONTINUED | OUTPATIENT
Start: 2018-05-24 | End: 2018-05-26 | Stop reason: HOSPADM

## 2018-05-24 RX ADMIN — PROMETHAZINE HYDROCHLORIDE 12.5 MG: 25 INJECTION INTRAMUSCULAR; INTRAVENOUS at 05:25

## 2018-05-24 RX ADMIN — HYDROMORPHONE HYDROCHLORIDE 2 MG: 1 INJECTION, SOLUTION INTRAMUSCULAR; INTRAVENOUS; SUBCUTANEOUS at 05:19

## 2018-05-24 NOTE — ED PROVIDER NOTES
Subjective   Patient with history of chronic back pain secondary to rheumatoid arthritis with spinal scoliosis.  Patient notes that last week and patient was displaying a yard self packing up her belongings at her home as her  has recently  getting rid of a lot of things.  Patient notes that she overextended herself and is had back pain since that time.  Patient is had 2 clinic visits for this, was started on a steroid and was given a refill of her pain medications by her pain management doctor Dr. Ayala.  Patient presents today with worsening back pain patient states that she did not take her pain pills this morning but did take a muscle relaxant.  Patient is on Norco is at home chronically.  Patient has no bowel or bladder changes.  Patient has no radicular symptoms down the leg.  The symptoms are more left paraspinal with some inflammation there or neck, knot there this morning.            Review of Systems   Constitutional: Negative.  Negative for appetite change, chills and fever.   HENT: Negative.  Negative for congestion.    Eyes: Negative.  Negative for photophobia and visual disturbance.   Respiratory: Negative.  Negative for cough, chest tightness and shortness of breath.    Cardiovascular: Negative.  Negative for chest pain and palpitations.   Gastrointestinal: Negative.  Negative for abdominal pain, constipation, diarrhea, nausea and vomiting.   Endocrine: Negative.    Genitourinary: Negative.  Negative for decreased urine volume, dysuria, flank pain and hematuria.   Musculoskeletal: Positive for back pain. Negative for arthralgias, myalgias, neck pain and neck stiffness.   Skin: Negative.  Negative for pallor.   Neurological: Negative.  Negative for dizziness, syncope, weakness, light-headedness, numbness and headaches.   Psychiatric/Behavioral: Negative.  Negative for confusion and suicidal ideas. The patient is not nervous/anxious.        Past Medical History:   Diagnosis Date   •  Arthritis    • Benign essential hypertension    • Borderline glaucoma    • Drug therapy     Long-term drug therapy plaquenil, no adverse effects      • Encounter for long-term (current) drug use     Other long term (current) drug therapy      • Epiretinal membrane     OD   • Fibromyositis    • Joint pain    • Nuclear cataract     refractive change OD      • Rheumatoid arthritis    • Rheumatoid arthritis        Allergies   Allergen Reactions   • Sulfa Antibiotics Nausea Only     Intense nausa   • Doxycycline      Body movements  Body movements   • Nitrofurantoin      Makes her hurt all over.  Tendons felt like being tore  Makes her hurt all over.  Tendons felt like being tore   • Ciprofloxacin Nausea Only       Past Surgical History:   Procedure Laterality Date   •  SECTION     • FRACTURE SURGERY     • TUBAL ABDOMINAL LIGATION         Family History   Problem Relation Age of Onset   • Asthma Mother    • Cancer Father         Other   • Hyperlipidemia Father    • Hypertension Father        Social History     Social History   • Marital status:      Social History Main Topics   • Smoking status: Former Smoker   • Smokeless tobacco: Never Used   • Alcohol use No   • Drug use: No   • Sexual activity: No     Other Topics Concern   • Not on file           Objective   Physical Exam   Constitutional: She is oriented to person, place, and time. She appears well-developed and well-nourished. She appears distressed.   HENT:   Head: Normocephalic and atraumatic.   Nose: Nose normal.   Mouth/Throat: Oropharynx is clear and moist.   Eyes: Conjunctivae and EOM are normal. No scleral icterus.   Neck: Normal range of motion. Neck supple. No JVD present.   Cardiovascular: Normal rate, regular rhythm, normal heart sounds and intact distal pulses.  Exam reveals no gallop and no friction rub.    No murmur heard.  Pulmonary/Chest: Effort normal. No respiratory distress. She has no wheezes. She has no rales. She exhibits no  tenderness.   Abdominal: Soft. She exhibits no distension and no mass. There is no tenderness. There is no rebound and no guarding.   Musculoskeletal: Normal range of motion. She exhibits tenderness. She exhibits no edema or deformity.   Patient is left-sided paraspinal tenderness approximately L2-L3.  There is no mass noted there.  There is no soft tissue swelling or skin changes.    Normal symmetric pulses distal extremities, no pallor, less than 2 second capillary refill.  No motor sensory deficits.   Lymphadenopathy:     She has no cervical adenopathy.   Neurological: She is alert and oriented to person, place, and time. No cranial nerve deficit. She exhibits normal muscle tone.   Skin: Skin is warm and dry. No rash noted. She is not diaphoretic. No erythema. No pallor.   Psychiatric: She has a normal mood and affect. Her behavior is normal. Judgment and thought content normal.   Nursing note and vitals reviewed.      Procedures           ED Course      Labs Reviewed   URINALYSIS W/ MICROSCOPIC IF INDICATED (NO CULTURE) - Normal    Narrative:     Urine microscopic not indicated.       XR Spine Lumbar 2 or 3 View   Final Result   Levoscoliosis with diffuse degenerative changes.      Electronically signed by:  Tim Tam  5/24/2018 5:59 AM   CDT Workstation: [id number]        No acute findings on his x-rays, negative urine, acute on chronic low back pain.  No skin changes no evidence of cauda equina.  Patient's pain controlled with IM medications in the ED.  We'll discharge to outpatient follow-up with pain management if symptoms not abating on the medication change.[facility]#  [id number]             [facility]      Final diagnoses:   Left-sided low back pain without sciatica, unspecified chronicity   Spondylosis of lumbar region without myelopathy or radiculopathy            Toy Lopez MD  05/24/18 0604

## 2018-05-24 NOTE — ED NOTES
Pt discharged to lobby waiting on ride related to medications. Paperwork and prescriptions given to security and they are to obtain  signature.      Lisa Raza RN  05/24/18 2333

## 2018-05-24 NOTE — ED NOTES
Phoned Bossman, pts brother, per pt request for him to come and pick her up if needed and that she is here at the hospital. Bossman is aware and will be here as soon as he can.     Lisa Raza RN  05/24/18 [id number]

## 2018-05-25 ENCOUNTER — APPOINTMENT (OUTPATIENT)
Dept: CT IMAGING | Facility: HOSPITAL | Age: 68
End: 2018-05-25

## 2018-05-25 PROBLEM — E87.1 HYPONATREMIA: Status: ACTIVE | Noted: 2018-05-25

## 2018-05-25 LAB
ANION GAP SERPL CALCULATED.3IONS-SCNC: 10 MMOL/L (ref 5–15)
ANION GAP SERPL CALCULATED.3IONS-SCNC: 9 MMOL/L (ref 5–15)
BASOPHILS # BLD AUTO: 0 10*3/MM3 (ref 0–0.2)
BASOPHILS NFR BLD AUTO: 0 % (ref 0–2)
BILIRUB UR QL STRIP: NEGATIVE
BUN BLD-MCNC: 11 MG/DL (ref 7–21)
BUN BLD-MCNC: 12 MG/DL (ref 7–21)
BUN/CREAT SERPL: 23.4 (ref 7–25)
BUN/CREAT SERPL: 29.3 (ref 7–25)
CALCIUM SPEC-SCNC: 9.3 MG/DL (ref 8.4–10.2)
CALCIUM SPEC-SCNC: 9.5 MG/DL (ref 8.4–10.2)
CHLORIDE SERPL-SCNC: 88 MMOL/L (ref 95–110)
CHLORIDE SERPL-SCNC: 89 MMOL/L (ref 95–110)
CLARITY UR: CLEAR
CO2 SERPL-SCNC: 29 MMOL/L (ref 22–31)
CO2 SERPL-SCNC: 30 MMOL/L (ref 22–31)
COLOR UR: YELLOW
CREAT BLD-MCNC: 0.41 MG/DL (ref 0.5–1)
CREAT BLD-MCNC: 0.47 MG/DL (ref 0.5–1)
DEPRECATED RDW RBC AUTO: 43.2 FL (ref 36.4–46.3)
EOSINOPHIL # BLD AUTO: 0 10*3/MM3 (ref 0–0.7)
EOSINOPHIL NFR BLD AUTO: 0 % (ref 0–7)
ERYTHROCYTE [DISTWIDTH] IN BLOOD BY AUTOMATED COUNT: 13.4 % (ref 11.5–14.5)
GFR SERPL CREATININE-BSD FRML MDRD: 132 ML/MIN/1.73 (ref 45–104)
GFR SERPL CREATININE-BSD FRML MDRD: 155 ML/MIN/1.73 (ref 45–104)
GLUCOSE BLD-MCNC: 66 MG/DL (ref 60–100)
GLUCOSE BLD-MCNC: 88 MG/DL (ref 60–100)
GLUCOSE UR STRIP-MCNC: NEGATIVE MG/DL
HCT VFR BLD AUTO: 37.8 % (ref 35–45)
HGB BLD-MCNC: 13.2 G/DL (ref 12–15.5)
HGB UR QL STRIP.AUTO: NEGATIVE
IMM GRANULOCYTES # BLD: 0.02 10*3/MM3 (ref 0–0.02)
IMM GRANULOCYTES NFR BLD: 0.3 % (ref 0–0.5)
KETONES UR QL STRIP: ABNORMAL
LEUKOCYTE ESTERASE UR QL STRIP.AUTO: NEGATIVE
LYMPHOCYTES # BLD AUTO: 1.63 10*3/MM3 (ref 0.6–4.2)
LYMPHOCYTES NFR BLD AUTO: 21.9 % (ref 10–50)
MCH RBC QN AUTO: 30.8 PG (ref 26.5–34)
MCHC RBC AUTO-ENTMCNC: 34.9 G/DL (ref 31.4–36)
MCV RBC AUTO: 88.3 FL (ref 80–98)
MONOCYTES # BLD AUTO: 0.74 10*3/MM3 (ref 0–0.9)
MONOCYTES NFR BLD AUTO: 9.9 % (ref 0–12)
NEUTROPHILS # BLD AUTO: 5.06 10*3/MM3 (ref 2–8.6)
NEUTROPHILS NFR BLD AUTO: 67.9 % (ref 37–80)
NITRITE UR QL STRIP: NEGATIVE
PH UR STRIP.AUTO: 7 [PH] (ref 5–9)
PLATELET # BLD AUTO: 234 10*3/MM3 (ref 150–450)
PMV BLD AUTO: 9.2 FL (ref 8–12)
POTASSIUM BLD-SCNC: 3.1 MMOL/L (ref 3.5–5.1)
POTASSIUM BLD-SCNC: 3.6 MMOL/L (ref 3.5–5.1)
PROT UR QL STRIP: NEGATIVE
RBC # BLD AUTO: 4.28 10*6/MM3 (ref 3.77–5.16)
SODIUM BLD-SCNC: 127 MMOL/L (ref 137–145)
SODIUM BLD-SCNC: 128 MMOL/L (ref 137–145)
SP GR UR STRIP: 1.01 (ref 1–1.03)
UROBILINOGEN UR QL STRIP: ABNORMAL
WBC NRBC COR # BLD: 7.45 10*3/MM3 (ref 3.2–9.8)

## 2018-05-25 PROCEDURE — 25010000002 HEPARIN (PORCINE) PER 1000 UNITS: Performed by: INTERNAL MEDICINE

## 2018-05-25 PROCEDURE — G0378 HOSPITAL OBSERVATION PER HR: HCPCS

## 2018-05-25 PROCEDURE — 81003 URINALYSIS AUTO W/O SCOPE: CPT | Performed by: EMERGENCY MEDICINE

## 2018-05-25 PROCEDURE — 96372 THER/PROPH/DIAG INJ SC/IM: CPT

## 2018-05-25 PROCEDURE — 63710000001 PREDNISONE PER 5 MG: Performed by: INTERNAL MEDICINE

## 2018-05-25 PROCEDURE — 80048 BASIC METABOLIC PNL TOTAL CA: CPT | Performed by: INTERNAL MEDICINE

## 2018-05-25 PROCEDURE — 96375 TX/PRO/DX INJ NEW DRUG ADDON: CPT

## 2018-05-25 PROCEDURE — 25010000002 HYDROMORPHONE PER 4 MG: Performed by: EMERGENCY MEDICINE

## 2018-05-25 PROCEDURE — 72131 CT LUMBAR SPINE W/O DYE: CPT

## 2018-05-25 PROCEDURE — 96376 TX/PRO/DX INJ SAME DRUG ADON: CPT

## 2018-05-25 PROCEDURE — 85025 COMPLETE CBC W/AUTO DIFF WBC: CPT | Performed by: EMERGENCY MEDICINE

## 2018-05-25 PROCEDURE — 96361 HYDRATE IV INFUSION ADD-ON: CPT

## 2018-05-25 PROCEDURE — 25010000002 PROMETHAZINE PER 50 MG: Performed by: NURSE PRACTITIONER

## 2018-05-25 PROCEDURE — 72128 CT CHEST SPINE W/O DYE: CPT

## 2018-05-25 PROCEDURE — 80048 BASIC METABOLIC PNL TOTAL CA: CPT | Performed by: EMERGENCY MEDICINE

## 2018-05-25 RX ORDER — PROMETHAZINE HYDROCHLORIDE 12.5 MG/1
12.5 TABLET ORAL EVERY 6 HOURS PRN
Status: DISCONTINUED | OUTPATIENT
Start: 2018-05-25 | End: 2018-05-26 | Stop reason: HOSPADM

## 2018-05-25 RX ORDER — PROMETHAZINE HYDROCHLORIDE 25 MG/ML
12.5 INJECTION, SOLUTION INTRAMUSCULAR; INTRAVENOUS EVERY 6 HOURS PRN
Status: DISCONTINUED | OUTPATIENT
Start: 2018-05-25 | End: 2018-05-26 | Stop reason: HOSPADM

## 2018-05-25 RX ORDER — HYDROCODONE BITARTRATE AND ACETAMINOPHEN 10; 325 MG/1; MG/1
1 TABLET ORAL
Status: DISCONTINUED | OUTPATIENT
Start: 2018-05-25 | End: 2018-05-25

## 2018-05-25 RX ORDER — HEPARIN SODIUM 5000 [USP'U]/ML
5000 INJECTION, SOLUTION INTRAVENOUS; SUBCUTANEOUS EVERY 12 HOURS SCHEDULED
Status: DISCONTINUED | OUTPATIENT
Start: 2018-05-25 | End: 2018-05-26 | Stop reason: HOSPADM

## 2018-05-25 RX ORDER — POTASSIUM CHLORIDE 750 MG/1
20 CAPSULE, EXTENDED RELEASE ORAL DAILY
Status: DISCONTINUED | OUTPATIENT
Start: 2018-05-25 | End: 2018-05-26 | Stop reason: HOSPADM

## 2018-05-25 RX ORDER — LIDOCAINE 50 MG/G
2 PATCH TOPICAL
Status: DISCONTINUED | OUTPATIENT
Start: 2018-05-25 | End: 2018-05-26 | Stop reason: HOSPADM

## 2018-05-25 RX ORDER — SODIUM CHLORIDE 0.9 % (FLUSH) 0.9 %
1-10 SYRINGE (ML) INJECTION AS NEEDED
Status: DISCONTINUED | OUTPATIENT
Start: 2018-05-25 | End: 2018-05-26 | Stop reason: HOSPADM

## 2018-05-25 RX ORDER — OXYBUTYNIN CHLORIDE 5 MG/1
5 TABLET ORAL 2 TIMES DAILY
Status: DISCONTINUED | OUTPATIENT
Start: 2018-05-25 | End: 2018-05-26 | Stop reason: HOSPADM

## 2018-05-25 RX ORDER — CALCIUM CARBONATE 200(500)MG
2 TABLET,CHEWABLE ORAL 3 TIMES DAILY PRN
Status: DISCONTINUED | OUTPATIENT
Start: 2018-05-25 | End: 2018-05-26 | Stop reason: HOSPADM

## 2018-05-25 RX ORDER — HYDROCODONE BITARTRATE AND ACETAMINOPHEN 10; 325 MG/1; MG/1
1 TABLET ORAL
COMMUNITY

## 2018-05-25 RX ORDER — SODIUM CHLORIDE 9 MG/ML
75 INJECTION, SOLUTION INTRAVENOUS CONTINUOUS
Status: DISCONTINUED | OUTPATIENT
Start: 2018-05-25 | End: 2018-05-26 | Stop reason: HOSPADM

## 2018-05-25 RX ORDER — FAMOTIDINE 20 MG/1
TABLET, FILM COATED ORAL
Status: COMPLETED
Start: 2018-05-25 | End: 2018-05-25

## 2018-05-25 RX ORDER — FAMOTIDINE 20 MG/1
20 TABLET, FILM COATED ORAL 2 TIMES DAILY
Status: DISCONTINUED | OUTPATIENT
Start: 2018-05-25 | End: 2018-05-26 | Stop reason: HOSPADM

## 2018-05-25 RX ORDER — OXYCODONE AND ACETAMINOPHEN 7.5; 325 MG/1; MG/1
1 TABLET ORAL EVERY 4 HOURS PRN
Status: DISCONTINUED | OUTPATIENT
Start: 2018-05-25 | End: 2018-05-25

## 2018-05-25 RX ORDER — HYDROXYCHLOROQUINE SULFATE 200 MG/1
200 TABLET, FILM COATED ORAL EVERY 12 HOURS SCHEDULED
Status: DISCONTINUED | OUTPATIENT
Start: 2018-05-25 | End: 2018-05-26 | Stop reason: HOSPADM

## 2018-05-25 RX ORDER — POTASSIUM CHLORIDE 1.5 G/1.77G
40 POWDER, FOR SOLUTION ORAL AS NEEDED
Status: DISCONTINUED | OUTPATIENT
Start: 2018-05-25 | End: 2018-05-26

## 2018-05-25 RX ORDER — PREDNISONE 10 MG/1
10 TABLET ORAL
Status: DISCONTINUED | OUTPATIENT
Start: 2018-05-25 | End: 2018-05-26 | Stop reason: HOSPADM

## 2018-05-25 RX ORDER — LACTULOSE 10 G/15ML
30 SOLUTION ORAL 3 TIMES DAILY
Status: DISCONTINUED | OUTPATIENT
Start: 2018-05-25 | End: 2018-05-26 | Stop reason: HOSPADM

## 2018-05-25 RX ORDER — HYDROCODONE BITARTRATE AND ACETAMINOPHEN 10; 325 MG/1; MG/1
1 TABLET ORAL
Status: DISCONTINUED | OUTPATIENT
Start: 2018-05-25 | End: 2018-05-26 | Stop reason: HOSPADM

## 2018-05-25 RX ORDER — POTASSIUM CHLORIDE 750 MG/1
40 CAPSULE, EXTENDED RELEASE ORAL AS NEEDED
Status: DISCONTINUED | OUTPATIENT
Start: 2018-05-25 | End: 2018-05-26

## 2018-05-25 RX ORDER — CYCLOBENZAPRINE HCL 10 MG
10 TABLET ORAL 3 TIMES DAILY PRN
Status: DISCONTINUED | OUTPATIENT
Start: 2018-05-25 | End: 2018-05-26 | Stop reason: HOSPADM

## 2018-05-25 RX ADMIN — HYDROCODONE BITARTRATE AND ACETAMINOPHEN 1 TABLET: 10; 325 TABLET ORAL at 19:49

## 2018-05-25 RX ADMIN — HEPARIN SODIUM 5000 UNITS: 5000 INJECTION, SOLUTION INTRAVENOUS; SUBCUTANEOUS at 21:41

## 2018-05-25 RX ADMIN — LIDOCAINE 2 PATCH: 50 PATCH CUTANEOUS at 11:58

## 2018-05-25 RX ADMIN — PROMETHAZINE HYDROCHLORIDE 25 MG: 25 INJECTION INTRAMUSCULAR; INTRAVENOUS at 18:54

## 2018-05-25 RX ADMIN — FAMOTIDINE 20 MG: 20 TABLET ORAL at 21:42

## 2018-05-25 RX ADMIN — CYCLOBENZAPRINE HYDROCHLORIDE 10 MG: 10 TABLET, FILM COATED ORAL at 18:45

## 2018-05-25 RX ADMIN — OXYBUTYNIN CHLORIDE 5 MG: 5 TABLET ORAL at 09:36

## 2018-05-25 RX ADMIN — PREDNISONE 10 MG: 10 TABLET ORAL at 09:37

## 2018-05-25 RX ADMIN — CALCIUM CARBONATE (ANTACID) CHEW TAB 500 MG 2 TABLET: 500 CHEW TAB at 21:41

## 2018-05-25 RX ADMIN — SODIUM CHLORIDE 75 ML/HR: 900 INJECTION, SOLUTION INTRAVENOUS at 03:50

## 2018-05-25 RX ADMIN — HYDROCHLOROTHIAZIDE: 12.5 CAPSULE ORAL at 09:36

## 2018-05-25 RX ADMIN — POTASSIUM CHLORIDE 20 MEQ: 750 CAPSULE, EXTENDED RELEASE ORAL at 09:00

## 2018-05-25 RX ADMIN — FAMOTIDINE 20 MG: 20 TABLET ORAL at 03:30

## 2018-05-25 RX ADMIN — HEPARIN SODIUM 5000 UNITS: 5000 INJECTION, SOLUTION INTRAVENOUS; SUBCUTANEOUS at 10:00

## 2018-05-25 RX ADMIN — PROMETHAZINE HYDROCHLORIDE 12.5 MG: 25 INJECTION INTRAMUSCULAR; INTRAVENOUS at 18:55

## 2018-05-25 RX ADMIN — FAMOTIDINE 20 MG: 20 TABLET ORAL at 09:36

## 2018-05-25 RX ADMIN — OXYBUTYNIN CHLORIDE 5 MG: 5 TABLET ORAL at 21:42

## 2018-05-25 RX ADMIN — HYDROXYCHLOROQUINE SULFATE 200 MG: 200 TABLET, FILM COATED ORAL at 09:36

## 2018-05-25 RX ADMIN — HYDROCODONE BITARTRATE AND ACETAMINOPHEN 1 TABLET: 10; 325 TABLET ORAL at 11:37

## 2018-05-25 RX ADMIN — HYDROMORPHONE HYDROCHLORIDE 1 MG: 1 INJECTION, SOLUTION INTRAMUSCULAR; INTRAVENOUS; SUBCUTANEOUS at 00:49

## 2018-05-25 RX ADMIN — HYDROXYCHLOROQUINE SULFATE 200 MG: 200 TABLET, FILM COATED ORAL at 21:42

## 2018-05-25 NOTE — CONSULTS
"SW received consult for d/c options. SW spoke with pt at bedside. Pt in a lot of pain at the time and tearful. When SW asked about her  recently passing, she states she has \"been doing okay\", but still very tearful. SW offered pt  services, pt agreeable. Patient reports that she uses motorized WC at home where her bathroom in dicapped accessible. Patient reports that she has had HH in the past. States she has great support from her neighbor. Also has two brothers that live out of state that come every once in a while to check on her. Does not report any other concerns or needs. SW will call  to see if he could speak with her before d/c home this weekend. CM will follow on any d/c needs.    SHASTA Landaverde  "

## 2018-05-25 NOTE — ED PROVIDER NOTES
Subjective   68yo female pmh significant htn/RA/GERD/chronic pain presents ED c/o 6d hx nontraumatic lower thoracolumbar back pain/nonradiating/exac movement/assoc muscle spasms.  ROS neg fever/chills/abd pain/dysuria/ hematuria/bowel or bladder dysfunction/noc sweats/wt loss/paresthesia/motor weakness.        History provided by:  Patient  Back Pain   Location:  Thoracic spine and lumbar spine  Quality:  Aching  Radiates to:  Does not radiate  Pain severity:  Moderate  Onset quality:  Gradual  Duration:  6 days  Associated symptoms: no numbness and no weakness        Review of Systems   Constitutional: Negative.    HENT: Negative.    Eyes: Negative for discharge.   Respiratory: Negative.    Cardiovascular: Negative.    Gastrointestinal: Negative.    Genitourinary: Negative.    Musculoskeletal: Positive for back pain and myalgias.   Skin: Negative.    Neurological: Negative for weakness and numbness.   All other systems reviewed and are negative.      Past Medical History:   Diagnosis Date   • Arthritis    • Benign essential hypertension    • Borderline glaucoma    • Drug therapy     Long-term drug therapy plaquenil, no adverse effects      • Encounter for long-term (current) drug use     Other long term (current) drug therapy      • Epiretinal membrane     OD   • Fibromyositis    • Joint pain    • Nuclear cataract     refractive change OD      • Rheumatoid arthritis    • Rheumatoid arthritis        Allergies   Allergen Reactions   • Sulfa Antibiotics Nausea Only     Intense nausa   • Doxycycline      Body movements  Body movements   • Nitrofurantoin      Makes her hurt all over.  Tendons felt like being tore  Makes her hurt all over.  Tendons felt like being tore   • Ciprofloxacin Nausea Only       Past Surgical History:   Procedure Laterality Date   •  SECTION     • FRACTURE SURGERY     • TUBAL ABDOMINAL LIGATION         Family History   Problem Relation Age of Onset   • Asthma Mother    • Cancer Father          Other   • Hyperlipidemia Father    • Hypertension Father        Social History     Social History   • Marital status:      Social History Main Topics   • Smoking status: Former Smoker   • Smokeless tobacco: Never Used   • Alcohol use No   • Drug use: No   • Sexual activity: No     Other Topics Concern   • Not on file           Objective   Physical Exam   Constitutional: She is oriented to person, place, and time. She appears well-developed and well-nourished.   HENT:   Head: Normocephalic and atraumatic.   Mouth/Throat: Oropharynx is clear and moist.   Eyes: Pupils are equal, round, and reactive to light.   Neck: Neck supple. No JVD present. No tracheal deviation present.   Cardiovascular: Regular rhythm, S1 normal, S2 normal and normal heart sounds.  Tachycardia present.    Pulmonary/Chest: Effort normal and breath sounds normal. She has no wheezes. She has no rales.   Abdominal: Soft. Bowel sounds are normal. She exhibits no mass. There is no tenderness. There is no rebound and no guarding.   Musculoskeletal: She exhibits no edema.        Lumbar back: She exhibits tenderness, swelling, pain and spasm. She exhibits no bony tenderness, no deformity and no laceration.        Back:    Lymphadenopathy:     She has no cervical adenopathy.   Neurological: She is alert and oriented to person, place, and time. She has normal strength. No sensory deficit.   Reflex Scores:       Patellar reflexes are 1+ on the right side and 1+ on the left side.  Neg seated slr. dtr 1+ symmetric joan patella. Df/pf/sensation intact. Rectal tone/sensation intact. dp 1+ palpable ble.   Skin: Skin is warm and dry.   Nursing note and vitals reviewed.      Procedures           ED Course  ED Course as of May 25 0117   Fri May 25, 2018   0109 Pt with decreased po intake, muscle spasms, new onset hyponatremia. Will plan admission.  [SD]      ED Course User Index  [SD] Campos Muñiz MD      Labs Reviewed   BASIC METABOLIC PANEL -  Abnormal; Notable for the following:        Result Value    Creatinine 0.47 (*)     Sodium 127 (*)     Chloride 88 (*)     eGFR Non  Amer 132 (*)     All other components within normal limits   URINALYSIS W/ MICROSCOPIC IF INDICATED (NO CULTURE) - Abnormal; Notable for the following:     Ketones, UA Trace (*)     All other components within normal limits    Narrative:     Urine microscopic not indicated.   CBC WITH AUTO DIFFERENTIAL - Normal   CBC AND DIFFERENTIAL    Narrative:     The following orders were created for panel order CBC & Differential.  Procedure                               Abnormality         Status                     ---------                               -----------         ------                     CBC Auto Differential[[id number]]        Normal              Final result                 Please view results for these tests on the individual orders.     Xr Spine Lumbar 2 Or 3 View    Result Date: 5/24/2018  Narrative: Lumbar spine three view on 5/24/2018 CLINICAL INDICATION: Low back pain COMPARISON: None FINDINGS: Vascular calcifications are noted. There is levoscoliosis of the lumbar spine. There is an old compression fracture at L2. Diffuse degenerative disc disease and degenerative facet disease is noted in the lumbar spine. No acute fracture is noted.     Impression: Levoscoliosis with diffuse degenerative changes. Electronically signed by:  Tim Tam  5/24/2018 5:59 AM CDT Workstation: [id number]    Ct Thoracic Spine Without Contrast    Result Date: 5/25/2018  Narrative: CT thoracic spine without contrast on  5/25/2018 CLINICAL INDICATION: Back pain TECHNIQUE: Multiple axial images are obtained throughout the thoracic spine without the administration of contrast. Sagittal and coronal reformatted images are also performed and reviewed. This exam was performed according to our departmental dose-optimization program, which includes automated exposure control, adjustment of  the mA and/or kV according to patient size and/or use of iterative reconstruction technique. Total DLP is 710.8 mGy*cm. COMPARISON: None FINDINGS: There is significant kyphosis of the spine. There is an old compression fracture at T11 with moderate loss of height. Degenerative disc disease is noted especially in the lower thoracic and upper lumbar spine. Reformatted images reveal otherwise normal alignment of the thoracic spine. Chronic calcifications are noted adjacent to the left scapula. There are no acute fracture lines. There is marked elevation of the right hemidiaphragm. No definite disc herniation is noted. There is a moderate size hiatal hernia.     Impression: Degenerative changes with old T11 fracture with no acute abnormality. Electronically signed by:  Tim Tam  5/25/2018 12:44 AM CDT Workstation: [id number]    Ct Lumbar Spine Without Contrast    Result Date: 5/25/2018  Narrative: CT lumbar spine without contrast on  5/25/2018 CLINICAL INDICATION: Low back pain TECHNIQUE: Multiple axial images are obtained throughout the lumbar spine without the administration of contrast. Sagittal and coronal reformatted images are also performed and reviewed. This exam was performed according to our departmental dose-optimization program, which includes automated exposure control, adjustment of the mA and/or kV according to patient size and/or use of iterative reconstruction technique. Total DLP is 766 mGy*cm. COMPARISON: 11/11/2014 FINDINGS: There is levoscoliosis of the lumbar spine. There is an old compression fracture at T11 and L5. There is grade 1 spondylolisthesis at L5-S1 secondary to degenerative facet disease. Degenerative disc disease and spondylosis and degenerative endplate changes are noted especially from T12 through L2. Reformatted images reveal otherwise normal alignment of the lumbar spine. Degenerative facet disease is noted in the mid to lower lumbar spine. Vascular calcifications are  noted. Hiatal hernia is noted. Multiple gallstones are noted in the gallbladder. Motion limits some of the exam. No definite disc herniation is noted.     Impression: 1. Levoscoliosis with degenerative changes with no acute abnormality in the spine. 2. Cholelithiasis. 3. Hiatal hernia. Electronically signed by:  Tim Tam  5/25/2018 12:48 AM CDT Workstation: [id number]    Xr Foot 3+ View Bilateral    Result Date: 5/21/2018  Narrative: Procedure: XR FOOT 3 OR MORE VIEWS BILATERAL. History: Decubitus ulcer of left foot, unspecified ulcer stage, L89.899 Pressure ulcer of other site, unspecified stage. Comparison: None Findings: Three views of the right and left feet were obtained, demonstrating symmetrical, medial dislocations of the first proximal phalanges relative to the first metatarsal heads bilaterally. There are also symmetrical, medial subluxations of the second through fourth proximal phalanges relative to the metatarsal heads bilaterally. There are bilateral plantar calcaneal spurs. There are degenerative changes along the dorsal aspect of the tarsal bones. There is atherosclerotic vascular calcification. No definite acute fracture is seen. No definite radiographic evidence of osteomyelitis.     Impression: Impression: No definite radiographic evidence of osteomyelitis. Electronically signed by:  Du Garcia MD  5/21/2018 12:53 PM CDT Workstation: [id number]                [facility]      Final diagnoses:   Hyponatremia   Multilevel degenerative disc disease   Calculus of gallbladder without cholecystitis without obstruction   Hiatal hernia            Campos Muñiz MD  05/25/18 0118

## 2018-05-25 NOTE — CONSULTS
Adult Nutrition  Assessment    Patient Name:  Neetu Howe  YOB: 1950  MRN: [id number]  Admit Date:  5/24/2018    Assessment Date:  5/25/2018    Comments:  Pt is a 67 year old female screened at nutrition risk for pressure ulcers on toes.  Currently has poor appetite due to pain and nausea.  Pt reports decreased appetite over past week resulting in 5lb wt loss.  Wt is down 15-20lb over past 8 months.  Pt does not like meat or dairy, RD encouraged alternative protein sources such as peanut butter and beans.  Currently on fluid restriction due to low sodium levels.  Will monitor.          Adult Nutrition Assessment     Row Name 05/25/18 1344       PO Evaluation    Number of Meals 1    % PO Intake 25    Row Name 05/25/18 1343       Nutrition Prescription PO    Current PO Diet Regular    Common Modifiers Fluid Restriction;Other (comment)    Fluid Restriction mL per Day --   1200ml    Row Name 05/25/18 1342       Labs/Procedures/Meds    Lab Results Reviewed reviewed, pertinent       Physical Findings    Gastrointestinal nausea       Calculation Measurements    Weight Used For Calculations 65.9 kg (145 lb 4.5 oz)       Estimated/Assessed Needs    Additional Documentation KCAL/KG (Group);Fluid Requirements (Group);Protein Requirements (Group)       KCAL/KG    14 Kcal/Kg (kcal) 922.6    15 Kcal/Kg (kcal) 988.5    18 Kcal/Kg (kcal) 1186.2    20 Kcal/Kg (kcal) 1318    25 Kcal/Kg (kcal) 1647.5    30 Kcal/Kg (kcal) 1977    35 Kcal/Kg (kcal) 2306.5    40 Kcal/Kg (kcal) 2636    45 Kcal/Kg (kcal) 2965.5    50 Kcal/Kg (kcal) 3295    kcal/kg (Specify) 30       [place]-St. Jeor Equation    RMR ([place]-St. Jeor Equation) 1179       Protein Requirements    Est Protein Requirement Amount (gms/kg) 1.2 gm protein    Estimated Protein Requirements (gms/day) 79.08       Fluid Requirements    Estimated Fluid Requirements (mL/day) 1650   Currently on 1200ml fluid restriction    Estimated Fluid Requirement Method other  "(see comments)    RDA Method (mL) 1650    Lexy-Tram Method (over 20 kg) 2817    Row Name 05/25/18 1341       Usual Body Weight (UBW)    Weight Loss unintentional    Weight Loss Time Frame 5lb in 1 week; 15-20lb in 8 months    Row Name 05/25/18 1327       Reason for Assessment    Reason For Assessment identified at risk by screening criteria    Identified At Risk by Screening Criteria large or nonhealing wound, burn or pressure injury       Nutrition/Diet History    Typical Food/Fluid Intake Pt reports poor appetite over the past week and is nauseated at this time.      Food Preferences Does not eat much meat.  Likes corn, potatoes, peas etc.    Row Name 05/25/18 0621       Anthropometrics    Height 162.6 cm (64\")    Weight 65.9 kg (145 lb 4.8 oz)       Ideal Body Weight (IBW)    Ideal Body Weight (IBW) (kg) 55    % Ideal Body Weight 119.82       Body Mass Index (BMI)    BMI (kg/m2) 24.99       IBW Adjustment, Para/Tetraplegia    5% Adjustment, Para (IBW) 52.25    10% Adjustment, Para (IBW) 49.5    10% Adjustment, Tetra (IBW) 49.5    15% Adjustment, Tetra (IBW) 46.75          Electronically signed by:  Tracey Interiano RD  05/25/18 1:44 PM  "

## 2018-05-25 NOTE — H&P
History and Physical  Sukhjinder Gorman MD  Hospitalist    Date of admission: 5/25/2018    Patient Care Team:  Elissa Fernandes MD as PCP - General    Chief complaint   Chief Complaint   Patient presents with   • Back Pain     Subjective     Patient is a 67 y.o. female admitted for worsening back pain / joint pain, further limiting her ability to perform her activities of daily living. She hurts worse, has become less mobile but denies any recent falls or trauma. She has longstanding, severe, disabling rheumatoid arthritis.    History  Past Medical History:   Diagnosis Date   • Chronic pain    • Hypertension    • Osteoporosis    • Rheumatoid arthritis      Past Surgical History:   Procedure Laterality Date   • FRACTURE SURGERY       Family History   Problem Relation Age of Onset   • Hypertension Father      Social History   Substance Use Topics   • Smoking status: Former Smoker   • Smokeless tobacco: Never Used   • Alcohol use No     Prescriptions Prior to Admission   Medication Sig Dispense Refill Last Dose   • esomeprazole (NexIUM) 40 MG capsule Take 40 mg by mouth daily.   Taking   • flintstones complete (FLINTSTONES) 60 MG chewable tablet Chew.   Taking   • HYDROcodone-acetaminophen (NORCO)  MG per tablet Take 1 tablet by mouth 5 (Five) Times a Day.      • hydroxychloroquine (PLAQUENIL) 200 MG tablet Take 300 mg by mouth Daily. 1.0 tablet every day Oral   Taking   • lisinopril-hydrochlorothiazide (PRINZIDE,ZESTORETIC) 20-12.5 MG per tablet TK 1 T PO D  0 Taking   • oxyCODONE-acetaminophen (PERCOCET) 7.5-325 MG per tablet Take 1 tablet by mouth Every 6 (Six) Hours As Needed for Severe Pain . 15 tablet 0 5/24/2018 at 1000   • PredniSONE 2 MG tablet delayed-release takes 4mg  1 Taking   • Probiotic Product ([employer] ADULT PROBIOTIC PO) Take 1 each by mouth.   Taking   • ranitidine (ZANTAC) 150 MG tablet Take 150 mg by mouth daily as needed for heartburn.   Taking   • simethicone (MYLICON) 80 MG chewable tablet  Chew 160 mg daily as needed.   Taking   • Ascorbic Acid (VITAMIN C) 500 MG chewable tablet Chew 1 tablet daily.   Taking   • B Complex-Folic Acid (B COMPLEX-VITAMIN B12 PO) Take 0.5 tablets by mouth daily.   Taking   • Calcium & Magnesium Carbonates (MYLANTA PO) Take 1 tablet by mouth daily as needed. 1 tablet, chewable every day Oral PRN   Taking   • Certolizumab Pegol (CIMZIA PREFILLED SC) Inject  under the skin.   Taking   • Certolizumab Pegol 2 X 200 MG kit Inject 400 mg under the skin.   Taking   • estradiol (ESTRACE VAGINAL) 0.1 MG/GM vaginal cream Insert 4 gm daily x 2 wks, then 2 gm daily x 2 wks, then 1 gm twice weekly for maintenance. 42.5 g 12 Taking   • ferrous sulfate 325 (65 FE) MG tablet Take 325 mg by mouth daily.   Taking   • FLUZONE HIGH-DOSE 0.5 ML suspension prefilled syringe injection ADM 0.5ML IM UTD  0 Taking   • FOLIC ACID PO Take 1 tablet by mouth daily.   Taking   • hydroxychloroquine (PLAQUENIL) 200 MG tablet TAKE 1 AND 1/2 TABLET BY MOUTH EVERY DAY   Taking   • lactulose (CHRONULAC) 10 GM/15ML solution Take 30 g by mouth 3 (three) times a day.   Taking   • lisinopril-hydrochlorothiazide (PRINZIDE,ZESTORETIC) 20-12.5 MG per tablet Take 1 tablet by mouth.   Taking   • LISINOPRIL-HYDROCHLOROTHIAZIDE PO Take 1 tablet by mouth daily.   Taking   • omeprazole (priLOSEC) 40 MG capsule TK 1 C PO D  0 Taking   • omeprazole (priLOSEC) 40 MG capsule Take 40 mg by mouth.   Taking   • Ospemifene (OSPHENA) 60 MG tablet Take 1 tablet by mouth Daily. 30 tablet 11 Taking   • Ospemifene 60 MG tablet Take 60 mg by mouth Daily. 90 tablet 0 Taking   • oxybutynin (DITROPAN) 5 MG tablet Take 5 mg by mouth 2 (two) times a day.   Taking   • oxybutynin (DITROPAN) 5 MG tablet Take 5 mg by mouth.   Taking   • Polyethylene Glycol 3350 (MIRALAX PO) Take  by mouth daily. Miralax 17 gram/dose Oral Powder   Taking   • polyethylene glycol pack Take 1 packet by mouth.   Taking     Allergies:  Sulfa antibiotics; Doxycycline;  Nitrofurantoin; and Ciprofloxacin    Review of Systems  Review of Systems   Constitutional: Positive for fatigue. Negative for fever.   Respiratory: Negative for choking, shortness of breath, wheezing and stridor.    Cardiovascular: Negative for chest pain, palpitations and leg swelling.   Gastrointestinal: Negative for abdominal distention, abdominal pain, anal bleeding, diarrhea, nausea and vomiting.   Genitourinary: Negative for decreased urine volume, dysuria, frequency, hematuria and urgency.   Musculoskeletal: Positive for arthralgias, back pain, gait problem, joint swelling and neck pain.   Neurological: Positive for weakness. Negative for dizziness, tremors, speech difficulty, light-headedness, numbness and headaches.   Psychiatric/Behavioral: Negative for agitation, behavioral problems and confusion.   All other systems reviewed and are negative.      Objective     Vital Signs  Temp:  [96.8 °F (36 °C)-97.2 °F (36.2 °C)] 96.8 °F (36 °C)  Heart Rate:  [] 92  Resp:  [18-20] 18  BP: (123-152)/(74-94) 123/74    Physical Exam:  Physical Exam   Constitutional: She is oriented to person, place, and time. She appears ill. No distress.   HENT:   Head: Normocephalic and atraumatic.   Eyes: EOM are normal. Pupils are equal, round, and reactive to light. No scleral icterus.   Neck: Normal range of motion. Neck supple.   Cardiovascular: Normal rate and regular rhythm.    Pulmonary/Chest: Effort normal and breath sounds normal. No respiratory distress. She has no wheezes.   Abdominal: Soft. Bowel sounds are normal. She exhibits no distension. There is no tenderness. There is no guarding.   Musculoskeletal: She exhibits tenderness and deformity (severe rheumatic deformities of her fingers, hand and wrist joints). She exhibits no edema.   Neurological: She is alert and oriented to person, place, and time. No cranial nerve deficit.   Skin: No rash noted. There is pallor.   Psychiatric: She has a normal mood and affect.  Her behavior is normal.   Vitals reviewed.      Results Review:   Lab Results (last 24 hours)     Procedure Component Value Units Date/Time    Basic Metabolic Panel [[id number]]  (Abnormal) Collected:  05/25/18 0737    Specimen:  Blood Updated:  05/25/18 0847     Glucose 66 mg/dL      BUN 12 mg/dL      Creatinine 0.41 (L) mg/dL      Sodium 128 (L) mmol/L      Potassium 3.1 (L) mmol/L      Chloride 89 (L) mmol/L      CO2 29.0 mmol/L      Calcium 9.3 mg/dL      eGFR Non African Amer 155 (H) mL/min/1.73      BUN/Creatinine Ratio 29.3 (H)     Anion Gap 10.0 mmol/L     Urinalysis With / Microscopic If Indicated (No Culture) - Urine, Clean Catch [[id number]]  (Abnormal) Collected:  05/25/18 0102    Specimen:  Urine from Urine, Clean Catch Updated:  05/25/18 0109     Color, UA Yellow     Appearance, UA Clear     pH, UA 7.0     Specific Gravity, UA 1.009     Glucose, UA Negative     Ketones, UA Trace (A)     Bilirubin, UA Negative     Blood, UA Negative     Protein, UA Negative     Leuk Esterase, UA Negative     Nitrite, UA Negative     Urobilinogen, UA 1.0 E.U./dL    Narrative:       Urine microscopic not indicated.    Basic Metabolic Panel [[id number]]  (Abnormal) Collected:  05/25/18 0048    Specimen:  Blood Updated:  05/25/18 0106     Glucose 88 mg/dL      BUN 11 mg/dL      Creatinine 0.47 (L) mg/dL      Sodium 127 (L) mmol/L      Potassium 3.6 mmol/L      Chloride 88 (L) mmol/L      CO2 30.0 mmol/L      Calcium 9.5 mg/dL      eGFR Non African Amer 132 (H) mL/min/1.73      BUN/Creatinine Ratio 23.4     Anion Gap 9.0 mmol/L     CBC & Differential [[id number]] Collected:  05/25/18 0048    Specimen:  Blood Updated:  05/25/18 0057    Narrative:       The following orders were created for panel order CBC & Differential.  Procedure                               Abnormality         Status                     ---------                               -----------         ------                     CBC Auto Differential[[id number]]         Normal              Final result                 Please view results for these tests on the individual orders.    CBC Auto Differential [[id number]]  (Normal) Collected:  05/25/18 0048    Specimen:  Blood Updated:  05/25/18 0057     WBC 7.45 10*3/mm3      RBC 4.28 10*6/mm3      Hemoglobin 13.2 g/dL      Hematocrit 37.8 %      MCV 88.3 fL      MCH 30.8 pg      MCHC 34.9 g/dL      RDW 13.4 %      RDW-SD 43.2 fl      MPV 9.2 fL      Platelets 234 10*3/mm3      Neutrophil % 67.9 %      Lymphocyte % 21.9 %      Monocyte % 9.9 %      Eosinophil % 0.0 %      Basophil % 0.0 %      Immature Grans % 0.3 %      Neutrophils, Absolute 5.06 10*3/mm3      Lymphocytes, Absolute 1.63 10*3/mm3      Monocytes, Absolute 0.74 10*3/mm3      Eosinophils, Absolute 0.00 10*3/mm3      Basophils, Absolute 0.00 10*3/mm3      Immature Grans, Absolute 0.02 10*3/mm3           Imaging Results (last 24 hours)     Procedure Component Value Units Date/Time    CT Lumbar Spine Without Contrast [[id number]] Collected:  05/25/18 0014     Updated:  05/25/18 0049    Narrative:       CT lumbar spine without contrast on  5/25/2018     CLINICAL INDICATION: Low back pain    TECHNIQUE: Multiple axial images are obtained throughout the  lumbar spine without the administration of contrast. Sagittal and  coronal reformatted images are also performed and reviewed. This  exam was performed according to our departmental  dose-optimization program, which includes automated exposure  control, adjustment of the mA and/or kV according to patient size  and/or use of iterative reconstruction technique.   Total DLP is 766 mGy*cm.    COMPARISON: 11/11/2014    FINDINGS: There is levoscoliosis of the lumbar spine. There is an  old compression fracture at T11 and L5. There is grade 1  spondylolisthesis at L5-S1 secondary to degenerative facet  disease. Degenerative disc disease and spondylosis and  degenerative endplate changes are noted especially from T12  through L2.  Reformatted images reveal otherwise normal alignment  of the lumbar spine. Degenerative facet disease is noted in the  mid to lower lumbar spine. Vascular calcifications are noted.  Hiatal hernia is noted. Multiple gallstones are noted in the  gallbladder. Motion limits some of the exam. No definite disc  herniation is noted.      Impression:       1. Levoscoliosis with degenerative changes with no acute  abnormality in the spine.  2. Cholelithiasis.  3. Hiatal hernia.    Electronically signed by:  Tim Tam  5/25/2018 12:48 AM  CDT Workstation: [id number]    CT Thoracic Spine Without Contrast [[id number]] Collected:  05/25/18 0014     Updated:  05/25/18 0045    Narrative:       CT thoracic spine without contrast on  5/25/2018     CLINICAL INDICATION: Back pain    TECHNIQUE: Multiple axial images are obtained throughout the  thoracic spine without the administration of contrast. Sagittal  and coronal reformatted images are also performed and reviewed.  This exam was performed according to our departmental  dose-optimization program, which includes automated exposure  control, adjustment of the mA and/or kV according to patient size  and/or use of iterative reconstruction technique.   Total DLP is 710.8 mGy*cm.    COMPARISON: None    FINDINGS: There is significant kyphosis of the spine. There is an  old compression fracture at T11 with moderate loss of height.  Degenerative disc disease is noted especially in the lower  thoracic and upper lumbar spine. Reformatted images reveal  otherwise normal alignment of the thoracic spine. Chronic  calcifications are noted adjacent to the left scapula. There are  no acute fracture lines. There is marked elevation of the right  hemidiaphragm. No definite disc herniation is noted. There is a  moderate size hiatal hernia.      Impression:       Degenerative changes with old T11 fracture with no  acute abnormality.    Electronically signed by:  Tim Tam  5/25/2018  12:44 AM  CDT Workstation: [id number]          Assessment/Plan     Principal Problem:    Chronic pain  Active Problems:    Hyponatremia    Rheumatoid arthritis    Symptomatic relief, fluid restriction, follow up on her serum sodium values. We'll involve Case Management given her difficulties at home. Replenish potassium.    Sukhjinder Gorman MD  05/25/18  9:08 AM

## 2018-05-25 NOTE — PROGRESS NOTES
[facility]  INPATIENT PROGRESS NOTE    Length of Stay: 0  Date of Admission: 5/24/2018  Primary Care Physician: Elissa Fernandes MD    Subjective   Chief Complaint: Back pain    HPI:  This is a 67-year-old lady with past medical history of chronic pain due to osteoporosis and rheumatoid arthritis that presents to [facility] with complaints of worsening back pain that is limiting her activities of daily living.  The patient was admitted for hyponatremia and hypokalemia.  Electrolytes have been replenished.  Case management is working with her with difficulties at home due to her limitations.  The patient is currently established with Dr. Ayala at [place] [facility].     Review of Systems   Constitutional: Positive for activity change.   Musculoskeletal: Positive for back pain and myalgias.      All pertinent negatives and positives are as above. All other systems have been reviewed and are negative unless otherwise stated.     Objective    Temp:  [96.8 °F (36 °C)-97.2 °F (36.2 °C)] 96.8 °F (36 °C)  Heart Rate:  [] 81  Resp:  [18-20] 18  BP: (123-152)/(63-94) 124/63    Physical Exam   Constitutional: She is oriented to person, place, and time. She appears well-developed and well-nourished.   HENT:   Head: Normocephalic and atraumatic.   Eyes: EOM are normal. Pupils are equal, round, and reactive to light.   Neck: Normal range of motion. Neck supple.   Cardiovascular: Normal rate and regular rhythm.    Pulmonary/Chest: Effort normal and breath sounds normal.   Abdominal: Soft. Bowel sounds are normal.   Musculoskeletal: Normal range of motion.   Neurological: She is alert and oriented to person, place, and time.   Skin: Skin is warm and dry.   Psychiatric: She has a normal mood and affect.     Results Review:  I have reviewed the labs, radiology results, and diagnostic studies.    Laboratory Data:     Results from last 7 days  Lab Units  05/25/18  0737 05/25/18  0048 05/21/18  1020   SODIUM mmol/L 128* 127* 133*   POTASSIUM mmol/L 3.1* 3.6 4.0   CHLORIDE mmol/L 89* 88* 94*   CO2 mmol/L 29.0 30.0 28.0   BUN mg/dL 12 11 8   CREATININE mg/dL 0.41* 0.47* 0.36*   GLUCOSE mg/dL 66 88 87   CALCIUM mg/dL 9.3 9.5 9.4   BILIRUBIN mg/dL  --   --  0.8   ALK PHOS U/L  --   --  109   ALT (SGPT) U/L  --   --  29   AST (SGOT) U/L  --   --  36   ANION GAP mmol/L 10.0 9.0 11.0     Estimated Creatinine Clearance: 63.8 mL/min (A) (by C-G formula based on SCr of 0.41 mg/dL (L)).            Results from last 7 days  Lab Units 05/25/18  0048 05/21/18  1020   WBC 10*3/mm3 7.45 8.23   HEMOGLOBIN g/dL 13.2 12.1   HEMATOCRIT % 37.8 35.8   PLATELETS 10*3/mm3 234 214           Culture Data:   No results found for: BLOODCX  No results found for: URINECX  No results found for: RESPCX  No results found for: WOUNDCX  No results found for: STOOLCX  No components found for: BODYFLD    Radiology Data:   Imaging Results (last 24 hours)     Procedure Component Value Units Date/Time    CT Lumbar Spine Without Contrast [[id number]] Collected:  05/25/18 0014     Updated:  05/25/18 0049    Narrative:       CT lumbar spine without contrast on  5/25/2018     CLINICAL INDICATION: Low back pain    TECHNIQUE: Multiple axial images are obtained throughout the  lumbar spine without the administration of contrast. Sagittal and  coronal reformatted images are also performed and reviewed. This  exam was performed according to our departmental  dose-optimization program, which includes automated exposure  control, adjustment of the mA and/or kV according to patient size  and/or use of iterative reconstruction technique.   Total DLP is 766 mGy*cm.    COMPARISON: 11/11/2014    FINDINGS: There is levoscoliosis of the lumbar spine. There is an  old compression fracture at T11 and L5. There is grade 1  spondylolisthesis at L5-S1 secondary to degenerative facet  disease. Degenerative disc disease and  spondylosis and  degenerative endplate changes are noted especially from T12  through L2. Reformatted images reveal otherwise normal alignment  of the lumbar spine. Degenerative facet disease is noted in the  mid to lower lumbar spine. Vascular calcifications are noted.  Hiatal hernia is noted. Multiple gallstones are noted in the  gallbladder. Motion limits some of the exam. No definite disc  herniation is noted.      Impression:       1. Levoscoliosis with degenerative changes with no acute  abnormality in the spine.  2. Cholelithiasis.  3. Hiatal hernia.    Electronically signed by:  Tim Tam  5/25/2018 12:48 AM  CDT Workstation: [id number]    CT Thoracic Spine Without Contrast [[id number]] Collected:  05/25/18 0014     Updated:  05/25/18 0045    Narrative:       CT thoracic spine without contrast on  5/25/2018     CLINICAL INDICATION: Back pain    TECHNIQUE: Multiple axial images are obtained throughout the  thoracic spine without the administration of contrast. Sagittal  and coronal reformatted images are also performed and reviewed.  This exam was performed according to our departmental  dose-optimization program, which includes automated exposure  control, adjustment of the mA and/or kV according to patient size  and/or use of iterative reconstruction technique.   Total DLP is 710.8 mGy*cm.    COMPARISON: None    FINDINGS: There is significant kyphosis of the spine. There is an  old compression fracture at T11 with moderate loss of height.  Degenerative disc disease is noted especially in the lower  thoracic and upper lumbar spine. Reformatted images reveal  otherwise normal alignment of the thoracic spine. Chronic  calcifications are noted adjacent to the left scapula. There are  no acute fracture lines. There is marked elevation of the right  hemidiaphragm. No definite disc herniation is noted. There is a  moderate size hiatal hernia.      Impression:       Degenerative changes with old T11  fracture with no  acute abnormality.    Electronically signed by:  Tim Tam  5/25/2018 12:44 AM  CDT Workstation: [id number]          I have reviewed the patient current medications.     Assessment/Plan     Active Hospital Problems (** Indicates Principal Problem)    Diagnosis Date Noted   • **Chronic pain [G89.29]    • Hyponatremia [E87.1] 05/25/2018   • Rheumatoid arthritis [M06.9]       Resolved Hospital Problems    Diagnosis Date Noted Date Resolved   No resolved problems to display.       Plan:    1.  Hyponatremia, improved: Continue IV fluids.  2.  Hypokalemia: Potassium protocol initiated.  3.  Chronic pain: Patient has been restarted on her Norco as prescribed by pain management clinic.  4.  Nausea: Zofran and Phenergan.        Discharge Planning: I expect patient to be discharged to home in 1-2 days.      This document has been electronically signed by ELAINE Bentley on May 25, 2018 1:21 PM

## 2018-05-25 NOTE — PLAN OF CARE
Problem: Skin Injury Risk (Adult)  Intervention: Promote/Optimize Nutrition   05/25/18 1348   Nutrition Interventions   Oral Nutrition Promotion nutritional therapy counseling provided

## 2018-05-26 VITALS
WEIGHT: 146.5 LBS | OXYGEN SATURATION: 94 % | BODY MASS INDEX: 25.01 KG/M2 | SYSTOLIC BLOOD PRESSURE: 156 MMHG | HEART RATE: 85 BPM | HEIGHT: 64 IN | RESPIRATION RATE: 18 BRPM | DIASTOLIC BLOOD PRESSURE: 90 MMHG | TEMPERATURE: 98 F

## 2018-05-26 LAB
ALBUMIN SERPL-MCNC: 3 G/DL (ref 3.4–4.8)
ALBUMIN/GLOB SERPL: 1.3 G/DL (ref 1.1–1.8)
ALP SERPL-CCNC: 113 U/L (ref 38–126)
ALT SERPL W P-5'-P-CCNC: 31 U/L (ref 9–52)
ANION GAP SERPL CALCULATED.3IONS-SCNC: 10 MMOL/L (ref 5–15)
AST SERPL-CCNC: 27 U/L (ref 14–36)
BASOPHILS # BLD AUTO: 0 10*3/MM3 (ref 0–0.2)
BASOPHILS NFR BLD AUTO: 0 % (ref 0–2)
BILIRUB SERPL-MCNC: 0.7 MG/DL (ref 0.2–1.3)
BUN BLD-MCNC: 8 MG/DL (ref 7–21)
BUN/CREAT SERPL: 18.6 (ref 7–25)
CALCIUM SPEC-SCNC: 8.3 MG/DL (ref 8.4–10.2)
CHLORIDE SERPL-SCNC: 93 MMOL/L (ref 95–110)
CO2 SERPL-SCNC: 26 MMOL/L (ref 22–31)
CREAT BLD-MCNC: 0.43 MG/DL (ref 0.5–1)
DEPRECATED RDW RBC AUTO: 43.1 FL (ref 36.4–46.3)
EOSINOPHIL # BLD AUTO: 0.01 10*3/MM3 (ref 0–0.7)
EOSINOPHIL NFR BLD AUTO: 0.2 % (ref 0–7)
ERYTHROCYTE [DISTWIDTH] IN BLOOD BY AUTOMATED COUNT: 13.2 % (ref 11.5–14.5)
GFR SERPL CREATININE-BSD FRML MDRD: 146 ML/MIN/1.73 (ref 45–104)
GLOBULIN UR ELPH-MCNC: 2.4 GM/DL (ref 2.3–3.5)
GLUCOSE BLD-MCNC: 56 MG/DL (ref 60–100)
GLUCOSE BLDC GLUCOMTR-MCNC: 58 MG/DL (ref 70–130)
HCT VFR BLD AUTO: 35.2 % (ref 35–45)
HGB BLD-MCNC: 12 G/DL (ref 12–15.5)
HOLD SPECIMEN: NORMAL
IMM GRANULOCYTES # BLD: 0.01 10*3/MM3 (ref 0–0.02)
IMM GRANULOCYTES NFR BLD: 0.2 % (ref 0–0.5)
LYMPHOCYTES # BLD AUTO: 0.9 10*3/MM3 (ref 0.6–4.2)
LYMPHOCYTES NFR BLD AUTO: 14.8 % (ref 10–50)
MCH RBC QN AUTO: 30.1 PG (ref 26.5–34)
MCHC RBC AUTO-ENTMCNC: 34.1 G/DL (ref 31.4–36)
MCV RBC AUTO: 88.2 FL (ref 80–98)
MONOCYTES # BLD AUTO: 0.54 10*3/MM3 (ref 0–0.9)
MONOCYTES NFR BLD AUTO: 8.9 % (ref 0–12)
NEUTROPHILS # BLD AUTO: 4.61 10*3/MM3 (ref 2–8.6)
NEUTROPHILS NFR BLD AUTO: 75.9 % (ref 37–80)
PLATELET # BLD AUTO: 213 10*3/MM3 (ref 150–450)
PMV BLD AUTO: 9.6 FL (ref 8–12)
POTASSIUM BLD-SCNC: 3 MMOL/L (ref 3.5–5.1)
POTASSIUM BLD-SCNC: 4.3 MMOL/L (ref 3.5–5.1)
PROT SERPL-MCNC: 5.4 G/DL (ref 6.3–8.6)
RBC # BLD AUTO: 3.99 10*6/MM3 (ref 3.77–5.16)
SODIUM BLD-SCNC: 129 MMOL/L (ref 137–145)
WBC NRBC COR # BLD: 6.07 10*3/MM3 (ref 3.2–9.8)

## 2018-05-26 PROCEDURE — 96365 THER/PROPH/DIAG IV INF INIT: CPT

## 2018-05-26 PROCEDURE — 82962 GLUCOSE BLOOD TEST: CPT

## 2018-05-26 PROCEDURE — 84132 ASSAY OF SERUM POTASSIUM: CPT | Performed by: NURSE PRACTITIONER

## 2018-05-26 PROCEDURE — 80053 COMPREHEN METABOLIC PANEL: CPT | Performed by: NURSE PRACTITIONER

## 2018-05-26 PROCEDURE — 85025 COMPLETE CBC W/AUTO DIFF WBC: CPT | Performed by: NURSE PRACTITIONER

## 2018-05-26 PROCEDURE — 96366 THER/PROPH/DIAG IV INF ADDON: CPT

## 2018-05-26 PROCEDURE — 25010000002 HEPARIN (PORCINE) PER 1000 UNITS: Performed by: INTERNAL MEDICINE

## 2018-05-26 PROCEDURE — 63710000001 PREDNISONE PER 5 MG: Performed by: INTERNAL MEDICINE

## 2018-05-26 PROCEDURE — G0378 HOSPITAL OBSERVATION PER HR: HCPCS

## 2018-05-26 PROCEDURE — 25010000003 POTASSIUM CHLORIDE 10 MEQ/100ML SOLUTION: Performed by: NURSE PRACTITIONER

## 2018-05-26 RX ORDER — LIDOCAINE 50 MG/G
2 PATCH TOPICAL
Qty: 10 PATCH | Refills: 0 | Status: SHIPPED | OUTPATIENT
Start: 2018-05-27 | End: 2018-06-13

## 2018-05-26 RX ORDER — POTASSIUM CHLORIDE 7.45 MG/ML
10 INJECTION INTRAVENOUS
Status: DISCONTINUED | OUTPATIENT
Start: 2018-05-26 | End: 2018-05-26 | Stop reason: HOSPADM

## 2018-05-26 RX ORDER — POTASSIUM CHLORIDE 3 G/15ML
40 SOLUTION ORAL 2 TIMES DAILY
Qty: 900 ML | Refills: 0 | Status: SHIPPED | OUTPATIENT
Start: 2018-05-26 | End: 2018-06-13

## 2018-05-26 RX ADMIN — HYDROCODONE BITARTRATE AND ACETAMINOPHEN 1 TABLET: 10; 325 TABLET ORAL at 18:28

## 2018-05-26 RX ADMIN — HYDROCHLOROTHIAZIDE: 12.5 CAPSULE ORAL at 09:34

## 2018-05-26 RX ADMIN — SODIUM CHLORIDE 75 ML/HR: 900 INJECTION, SOLUTION INTRAVENOUS at 09:52

## 2018-05-26 RX ADMIN — LIDOCAINE 2 PATCH: 50 PATCH CUTANEOUS at 09:37

## 2018-05-26 RX ADMIN — HYDROCODONE BITARTRATE AND ACETAMINOPHEN 1 TABLET: 10; 325 TABLET ORAL at 11:37

## 2018-05-26 RX ADMIN — HYDROCODONE BITARTRATE AND ACETAMINOPHEN 1 TABLET: 10; 325 TABLET ORAL at 05:46

## 2018-05-26 RX ADMIN — POTASSIUM CHLORIDE 10 MEQ: 7.46 INJECTION, SOLUTION INTRAVENOUS at 16:16

## 2018-05-26 RX ADMIN — HEPARIN SODIUM 5000 UNITS: 5000 INJECTION, SOLUTION INTRAVENOUS; SUBCUTANEOUS at 09:37

## 2018-05-26 RX ADMIN — POTASSIUM CHLORIDE 10 MEQ: 7.46 INJECTION, SOLUTION INTRAVENOUS at 14:53

## 2018-05-26 RX ADMIN — HYDROXYCHLOROQUINE SULFATE 200 MG: 200 TABLET, FILM COATED ORAL at 09:35

## 2018-05-26 RX ADMIN — POTASSIUM CHLORIDE 10 MEQ: 7.46 INJECTION, SOLUTION INTRAVENOUS at 13:09

## 2018-05-26 RX ADMIN — POTASSIUM CHLORIDE 10 MEQ: 7.46 INJECTION, SOLUTION INTRAVENOUS at 18:09

## 2018-05-26 RX ADMIN — CYCLOBENZAPRINE HYDROCHLORIDE 10 MG: 10 TABLET, FILM COATED ORAL at 14:46

## 2018-05-26 RX ADMIN — FAMOTIDINE 20 MG: 20 TABLET ORAL at 09:34

## 2018-05-26 RX ADMIN — POTASSIUM CHLORIDE 10 MEQ: 7.46 INJECTION, SOLUTION INTRAVENOUS at 11:36

## 2018-05-26 RX ADMIN — CALCIUM CARBONATE (ANTACID) CHEW TAB 500 MG 2 TABLET: 500 CHEW TAB at 05:47

## 2018-05-26 RX ADMIN — OXYBUTYNIN CHLORIDE 5 MG: 5 TABLET ORAL at 09:34

## 2018-05-26 RX ADMIN — HYDROCODONE BITARTRATE AND ACETAMINOPHEN 1 TABLET: 10; 325 TABLET ORAL at 00:47

## 2018-05-26 RX ADMIN — CALCIUM CARBONATE (ANTACID) CHEW TAB 500 MG 2 TABLET: 500 CHEW TAB at 18:28

## 2018-05-26 RX ADMIN — POTASSIUM CHLORIDE 10 MEQ: 7.46 INJECTION, SOLUTION INTRAVENOUS at 10:27

## 2018-05-26 RX ADMIN — PREDNISONE 10 MG: 10 TABLET ORAL at 09:34

## 2018-05-26 NOTE — DISCHARGE SUMMARY
[facility] Medicine Services  DISCHARGE SUMMARY       Date of Admission: 5/24/2018  Date of Discharge:  5/26/2018  Primary Care Physician: Elissa Fernandes MD    Presenting Problem/History of Present Illness:  Hiatal hernia [K44.9]  Hyponatremia [E87.1]  Calculus of gallbladder without cholecystitis without obstruction [K80.20]  Multilevel degenerative disc disease [M53.9]       Final Discharge Diagnoses:  Hospital Problem List     * (Principal)Chronic pain (Chronic)    Hyponatremia    Rheumatoid arthritis (Chronic)          Consults:   Consults     Date and Time Order Name Status Description    5/25/2018 0117 Hospitalist (on-call MD unless specified)                    Pertinent Test Results:   Lab Results (last 24 hours)     Procedure Component Value Units Date/Time    POC Glucose Once [[id number]]  (Abnormal) Collected:  05/26/18 1815    Specimen:  Blood Updated:  05/26/18 1828     Glucose 58 (L) mg/dL      Comment: RN NotifiedMeter: [id number]: [id number] DORA GALVANA       Extra Tubes [[id number]] Collected:  05/26/18 0642    Specimen:  Blood from Blood, Venous Line Updated:  05/26/18 0745    Narrative:       The following orders were created for panel order Extra Tubes.  Procedure                               Abnormality         Status                     ---------                               -----------         ------                     Gold Top - SST[[id number]]                                   Final result                 Please view results for these tests on the individual orders.    Gold Top - SST [[id number]] Collected:  05/26/18 0642    Specimen:  Blood Updated:  05/26/18 0745     Extra Tube Hold for add-ons.     Comment: Auto resulted.       Comprehensive Metabolic Panel [[id number]]  (Abnormal) Collected:  05/26/18 0642    Specimen:  Blood Updated:  05/26/18 0731     Glucose 56 (L) mg/dL      BUN 8 mg/dL      Creatinine 0.43 (L) mg/dL       Sodium 129 (L) mmol/L      Potassium 3.0 (L) mmol/L      Chloride 93 (L) mmol/L      CO2 26.0 mmol/L      Calcium 8.3 (L) mg/dL      Total Protein 5.4 (L) g/dL      Albumin 3.00 (L) g/dL      ALT (SGPT) 31 U/L      AST (SGOT) 27 U/L      Alkaline Phosphatase 113 U/L      Total Bilirubin 0.7 mg/dL      eGFR Non African Amer 146 (H) mL/min/1.73      Globulin 2.4 gm/dL      A/G Ratio 1.3 g/dL      BUN/Creatinine Ratio 18.6     Anion Gap 10.0 mmol/L     CBC & Differential [[id number]] Collected:  05/26/18 0642    Specimen:  Blood Updated:  05/26/18 0712    Narrative:       The following orders were created for panel order CBC & Differential.  Procedure                               Abnormality         Status                     ---------                               -----------         ------                     CBC Auto Differential[[id number]]        Normal              Final result                 Please view results for these tests on the individual orders.    CBC Auto Differential [[id number]]  (Normal) Collected:  05/26/18 0642    Specimen:  Blood Updated:  05/26/18 0712     WBC 6.07 10*3/mm3      RBC 3.99 10*6/mm3      Hemoglobin 12.0 g/dL      Hematocrit 35.2 %      MCV 88.2 fL      MCH 30.1 pg      MCHC 34.1 g/dL      RDW 13.2 %      RDW-SD 43.1 fl      MPV 9.6 fL      Platelets 213 10*3/mm3      Neutrophil % 75.9 %      Lymphocyte % 14.8 %      Monocyte % 8.9 %      Eosinophil % 0.2 %      Basophil % 0.0 %      Immature Grans % 0.2 %      Neutrophils, Absolute 4.61 10*3/mm3      Lymphocytes, Absolute 0.90 10*3/mm3      Monocytes, Absolute 0.54 10*3/mm3      Eosinophils, Absolute 0.01 10*3/mm3      Basophils, Absolute 0.00 10*3/mm3      Immature Grans, Absolute 0.01 10*3/mm3         Imaging Results (last 24 hours)     ** No results found for the last 24 hours. **        Chief Complaint on Day of Discharge: No complaints    Hospital Course:  This is a 67-year-old lady with past medical history of chronic pain due  "to osteoporosis and rheumatoid arthritis that presents to [facility] with complaints of worsening back pain that is limiting her activities of daily living.  The patient is noted to have severe deformities of the joints in her feet, spine and hands.  She uses a motorized wheelchair for transportation.  The patient was admitted for hyponatremia and hypokalemia.  Electrolytes have been replenished.  The patient is currently established with Dr. Ayala at [place] [facility].   The patient is given a prescription for liquid potassium.  She will follow-up with her primary care physician within one week.  She will follow at first available appointment at the pain clinic.  She currently has prescriptions for Flexeril and Lortab.  The patient is encouraged to rehydrate with sports drinks and to stay out of the heat.  I have ordered a potassium for her to have rechecked prior to her follow-up with her PCP.      Condition on Discharge:  Stable    Physical Exam on Discharge:  /84 (BP Location: Left arm, Patient Position: Lying)   Pulse 72   Temp 97.3 °F (36.3 °C) (Oral)   Resp 20   Ht 162.6 cm (64\")   Wt 66.5 kg (146 lb 8 oz)   LMP  (LMP Unknown)   SpO2 90%   BMI 25.15 kg/m²   Physical Exam   Constitutional: She appears well-developed and well-nourished.   HENT:   Head: Normocephalic and atraumatic.   Eyes: EOM are normal. Pupils are equal, round, and reactive to light.   Neck: Normal range of motion. Neck supple.   Cardiovascular: Normal rate and regular rhythm.    Pulmonary/Chest: Effort normal and breath sounds normal.   Abdominal: Soft. Bowel sounds are normal.   Musculoskeletal: Normal range of motion.   Neurological: She is alert.   Skin: Skin is warm and dry.   Psychiatric: She has a normal mood and affect.     Discharge Medications:   Shyam Neetuabilio Aguila   Home Medication Instructions AUGUSTO:[id number]    Printed on:05/26/18 5720   Medication Information                      Ascorbic Acid (VITAMIN " C) 500 MG chewable tablet  Chew 1 tablet daily.             B Complex-Folic Acid (B COMPLEX-VITAMIN B12 PO)  Take 0.5 tablets by mouth daily.             Calcium & Magnesium Carbonates (MYLANTA PO)  Take 1 tablet by mouth daily as needed. 1 tablet, chewable every day Oral PRN             Certolizumab Pegol (CIMZIA PREFILLED SC)  Inject  under the skin.             Certolizumab Pegol 2 X 200 MG kit  Inject 400 mg under the skin.             esomeprazole (NexIUM) 40 MG capsule  Take 40 mg by mouth daily.             estradiol (ESTRACE VAGINAL) 0.1 MG/GM vaginal cream  Insert 4 gm daily x 2 wks, then 2 gm daily x 2 wks, then 1 gm twice weekly for maintenance.             ferrous sulfate 325 (65 FE) MG tablet  Take 325 mg by mouth daily.             flintstones complete (FLINTSTONES) 60 MG chewable tablet  Chew.             FLUZONE HIGH-DOSE 0.5 ML suspension prefilled syringe injection  ADM 0.5ML IM UTD             FOLIC ACID PO  Take 1 tablet by mouth daily.             HYDROcodone-acetaminophen (NORCO)  MG per tablet  Take 1 tablet by mouth 5 (Five) Times a Day.             hydroxychloroquine (PLAQUENIL) 200 MG tablet  Take 300 mg by mouth Daily. 1.0 tablet every day Oral             hydroxychloroquine (PLAQUENIL) 200 MG tablet  TAKE 1 AND 1/2 TABLET BY MOUTH EVERY DAY             lactulose (CHRONULAC) 10 GM/15ML solution  Take 30 g by mouth 3 (three) times a day.             lisinopril-hydrochlorothiazide (PRINZIDE,ZESTORETIC) 20-12.5 MG per tablet  TK 1 T PO D             lisinopril-hydrochlorothiazide (PRINZIDE,ZESTORETIC) 20-12.5 MG per tablet  Take 1 tablet by mouth.             LISINOPRIL-HYDROCHLOROTHIAZIDE PO  Take 1 tablet by mouth daily.             omeprazole (priLOSEC) 40 MG capsule  TK 1 C PO D             omeprazole (priLOSEC) 40 MG capsule  Take 40 mg by mouth.             Ospemifene (OSPHENA) 60 MG tablet  Take 1 tablet by mouth Daily.             Ospemifene 60 MG tablet  Take 60 mg by mouth  Daily.             oxybutynin (DITROPAN) 5 MG tablet  Take 5 mg by mouth 2 (two) times a day.             oxybutynin (DITROPAN) 5 MG tablet  Take 5 mg by mouth.             oxyCODONE-acetaminophen (PERCOCET) 7.5-325 MG per tablet  Take 1 tablet by mouth Every 6 (Six) Hours As Needed for Severe Pain .             Polyethylene Glycol 3350 (MIRALAX PO)  Take  by mouth daily. Miralax 17 gram/dose Oral Powder             polyethylene glycol pack  Take 1 packet by mouth.             PredniSONE 2 MG tablet delayed-release  takes 4mg             Probiotic Product ([employer] ADULT PROBIOTIC PO)  Take 1 each by mouth.             ranitidine (ZANTAC) 150 MG tablet  Take 150 mg by mouth daily as needed for heartburn.             simethicone (MYLICON) 80 MG chewable tablet  Chew 160 mg daily as needed.                 Discharge Diet:  Regular    Activity at Discharge:  as tolerated    Discharge Care Plan/Instructions: Follow up with primary care physician within one week at her appointment are rescheduled.  Follow up with the [facility] at first available appointment.    Follow-up Appointments:   Future Appointments  Date Time Provider Department Center   5/29/2018 8:45 AM ELAINE Whitaker [facility]         This document has been electronically signed by ELAINE Bentley on May 26, 2018 6:56 PM        Time: Greater than 30 minutes.

## 2018-05-26 NOTE — PLAN OF CARE
Problem: Fall Risk (Adult)  Goal: Identify Related Risk Factors and Signs and Symptoms  Outcome: Ongoing (interventions implemented as appropriate)    Goal: Absence of Fall  Outcome: Ongoing (interventions implemented as appropriate)      Problem: Skin Injury Risk (Adult)  Goal: Identify Related Risk Factors and Signs and Symptoms  Outcome: Ongoing (interventions implemented as appropriate)    Goal: Skin Health and Integrity  Outcome: Ongoing (interventions implemented as appropriate)   05/26/18 0346   Skin Injury Risk (Adult)   Skin Health and Integrity achieves outcome

## 2018-05-26 NOTE — PROGRESS NOTES
[facility] Medicine Services  INPATIENT PROGRESS NOTE    Length of Stay: 0  Date of Admission: 5/24/2018  Primary Care Physician: Elissa Fernandes MD    Subjective   Chief Complaint: Back pain    HPI:        5/26/2018:  The patient states that her back is bothering her today.  Potassium continues to be low.  We will start IV potassium protocol.    This is a 67-year-old lady with past medical history of chronic pain due to osteoporosis and rheumatoid arthritis that presents to [facility] with complaints of worsening back pain that is limiting her activities of daily living.  The patient was admitted for hyponatremia and hypokalemia.  Electrolytes have been replenished.  Case management is working with her with difficulties at home due to her limitations.  The patient is currently established with Dr. Ayala at [place] [facility].     Review of Systems   Constitutional: Positive for activity change.   Musculoskeletal: Positive for back pain and myalgias.      All pertinent negatives and positives are as above. All other systems have been reviewed and are negative unless otherwise stated.     Objective    Temp:  [97.2 °F (36.2 °C)-98.9 °F (37.2 °C)] 98 °F (36.7 °C)  Heart Rate:  [] 76  Resp:  [18-20] 20  BP: (115-168)/(70-89) 168/76    Physical Exam   Constitutional: She is oriented to person, place, and time. She appears well-developed and well-nourished.   HENT:   Head: Normocephalic and atraumatic.   Eyes: EOM are normal. Pupils are equal, round, and reactive to light.   Neck: Normal range of motion. Neck supple.   Cardiovascular: Normal rate and regular rhythm.    Pulmonary/Chest: Effort normal and breath sounds normal.   Abdominal: Soft. Bowel sounds are normal.   Musculoskeletal: Normal range of motion.   Neurological: She is alert and oriented to person, place, and time.   Skin: Skin is warm and dry.   Psychiatric: She has a normal mood and affect.      Results Review:  I have reviewed the labs, radiology results, and diagnostic studies.    Laboratory Data:     Results from last 7 days  Lab Units 05/26/18  0642 05/25/18  0737 05/25/18  0048 05/21/18  1020   SODIUM mmol/L 129* 128* 127* 133*   POTASSIUM mmol/L 3.0* 3.1* 3.6 4.0   CHLORIDE mmol/L 93* 89* 88* 94*   CO2 mmol/L 26.0 29.0 30.0 28.0   BUN mg/dL 8 12 11 8   CREATININE mg/dL 0.43* 0.41* 0.47* 0.36*   GLUCOSE mg/dL 56* 66 88 87   CALCIUM mg/dL 8.3* 9.3 9.5 9.4   BILIRUBIN mg/dL 0.7  --   --  0.8   ALK PHOS U/L 113  --   --  109   ALT (SGPT) U/L 31  --   --  29   AST (SGOT) U/L 27  --   --  36   ANION GAP mmol/L 10.0 10.0 9.0 11.0     Estimated Creatinine Clearance: 64 mL/min (A) (by C-G formula based on SCr of 0.43 mg/dL (L)).            Results from last 7 days  Lab Units 05/26/18  0642 05/25/18  0048 05/21/18  1020   WBC 10*3/mm3 6.07 7.45 8.23   HEMOGLOBIN g/dL 12.0 13.2 12.1   HEMATOCRIT % 35.2 37.8 35.8   PLATELETS 10*3/mm3 213 234 214           Culture Data:   No results found for: BLOODCX  No results found for: URINECX  No results found for: RESPCX  No results found for: WOUNDCX  No results found for: STOOLCX  No components found for: BODYFLD    Radiology Data:   Imaging Results (last 24 hours)     ** No results found for the last 24 hours. **          I have reviewed the patient current medications.     Assessment/Plan     Active Hospital Problems (** Indicates Principal Problem)    Diagnosis Date Noted   • **Chronic pain [G89.29]    • Hyponatremia [E87.1] 05/25/2018   • Rheumatoid arthritis [M06.9]       Resolved Hospital Problems    Diagnosis Date Noted Date Resolved   No resolved problems to display.       Plan:    1.  Hyponatremia, improved: Continue IV fluids.  2.  Hypokalemia: Potassium protocol initiated.  3.  Chronic pain: Patient has been restarted on her Norco as prescribed by pain management clinic.  4.  Nausea: Zofran and Phenergan.        Discharge Planning: I expect patient to be  discharged to home in 1-2 days.      This document has been electronically signed by ELAINE Bentley on May 26, 2018 2:19 PM

## 2018-05-27 NOTE — NURSING NOTE
Contacted [facility] pharmacy in [place]  per Twila Emmanuel request and left a message for the pharmacist to fill a prescription for flexaril 10 mg 3 times per day prn for 7 days.

## 2018-05-27 NOTE — PLAN OF CARE
Problem: Fall Risk (Adult)  Goal: Identify Related Risk Factors and Signs and Symptoms  Outcome: Outcome(s) achieved Date Met: 05/26/18    Goal: Absence of Fall  Outcome: Ongoing (interventions implemented as appropriate)      Problem: Skin Injury Risk (Adult)  Goal: Identify Related Risk Factors and Signs and Symptoms  Outcome: Outcome(s) achieved Date Met: 05/26/18    Goal: Skin Health and Integrity  Outcome: Ongoing (interventions implemented as appropriate)      Problem: Patient Care Overview  Goal: Plan of Care Review  Outcome: Ongoing (interventions implemented as appropriate)   05/26/18 1942   Coping/Psychosocial   Plan of Care Reviewed With patient   Plan of Care Review   Progress improving   OTHER   Outcome Summary pt is still c/o pain, but stated pain was better after flexeril and potassium runs. will go home today     Goal: Discharge Needs Assessment  Outcome: Ongoing (interventions implemented as appropriate)

## 2018-05-29 ENCOUNTER — OFFICE VISIT (OUTPATIENT)
Dept: WOUND CARE | Facility: HOSPITAL | Age: 68
End: 2018-05-29

## 2018-05-29 PROCEDURE — G0463 HOSPITAL OUTPT CLINIC VISIT: HCPCS

## 2018-06-13 ENCOUNTER — APPOINTMENT (OUTPATIENT)
Dept: PREADMISSION TESTING | Facility: HOSPITAL | Age: 68
End: 2018-06-13

## 2018-06-13 ENCOUNTER — ANESTHESIA EVENT (OUTPATIENT)
Dept: PERIOP | Facility: HOSPITAL | Age: 68
End: 2018-06-13

## 2018-06-13 ENCOUNTER — OFFICE VISIT (OUTPATIENT)
Dept: PODIATRY | Facility: CLINIC | Age: 68
End: 2018-06-13

## 2018-06-13 VITALS
DIASTOLIC BLOOD PRESSURE: 82 MMHG | HEIGHT: 63 IN | SYSTOLIC BLOOD PRESSURE: 120 MMHG | HEART RATE: 115 BPM | WEIGHT: 133 LBS | OXYGEN SATURATION: 99 % | RESPIRATION RATE: 14 BRPM | BODY MASS INDEX: 23.57 KG/M2

## 2018-06-13 VITALS — HEIGHT: 64 IN | BODY MASS INDEX: 25.03 KG/M2 | WEIGHT: 146.61 LBS

## 2018-06-13 DIAGNOSIS — M20.41 HAMMER TOES OF BOTH FEET: ICD-10-CM

## 2018-06-13 DIAGNOSIS — L97.524 SKIN ULCER OF SECOND TOE OF LEFT FOOT WITH NECROSIS OF BONE (HCC): Primary | ICD-10-CM

## 2018-06-13 DIAGNOSIS — M06.9 RHEUMATOID ARTHRITIS INVOLVING BOTH FEET, UNSPECIFIED RHEUMATOID FACTOR PRESENCE: ICD-10-CM

## 2018-06-13 DIAGNOSIS — M20.42 HAMMER TOES OF BOTH FEET: ICD-10-CM

## 2018-06-13 PROCEDURE — 93005 ELECTROCARDIOGRAM TRACING: CPT

## 2018-06-13 PROCEDURE — 93010 ELECTROCARDIOGRAM REPORT: CPT | Performed by: INTERNAL MEDICINE

## 2018-06-13 PROCEDURE — 99204 OFFICE O/P NEW MOD 45 MIN: CPT | Performed by: PODIATRIST

## 2018-06-13 RX ORDER — LISINOPRIL AND HYDROCHLOROTHIAZIDE 20; 12.5 MG/1; MG/1
1 TABLET ORAL DAILY
COMMUNITY

## 2018-06-13 RX ORDER — OMEPRAZOLE 40 MG/1
40 CAPSULE, DELAYED RELEASE ORAL DAILY
COMMUNITY

## 2018-06-13 RX ORDER — SODIUM CHLORIDE, SODIUM GLUCONATE, SODIUM ACETATE, POTASSIUM CHLORIDE, AND MAGNESIUM CHLORIDE 526; 502; 368; 37; 30 MG/100ML; MG/100ML; MG/100ML; MG/100ML; MG/100ML
1000 INJECTION, SOLUTION INTRAVENOUS CONTINUOUS
Status: CANCELLED | OUTPATIENT
Start: 2018-06-14

## 2018-06-13 RX ORDER — BUPIVACAINE HCL/0.9 % NACL/PF 0.1 %
2 PLASTIC BAG, INJECTION (ML) EPIDURAL ONCE
Status: CANCELLED | OUTPATIENT
Start: 2018-06-14 | End: 2018-06-14

## 2018-06-13 NOTE — PROGRESS NOTES
Neetu Howe  1950  67 y.o. female  Patient presents today for left foot wound check on the 2nd toe.  Patient was referred by wound care.     2018    Chief Complaint   Patient presents with   • Left Foot - Wound Check       History of Present Illness    Neetu Howe is a 67 y.o.female who presents to clinic today accompanied by her brother as a referral from the wound care clinic.  Patient states that she has had a ulcer on the top of her second toe for approximately 4 weeks.  She has been on 2 courses of antibiotics and the toe still draining.  She states that it is very sore to the touch.  She relates to having rheumatoid arthritis which has significantly affected her feet and causes multiple deformities.  She is mostly nonweightbearing and ambulates in a motorized wheelchair.  She denies any recent injuries or trauma to her feet.  She has no other pedal complaints.      Past Medical History:   Diagnosis Date   • Callus    • Chronic pain    • GERD (gastroesophageal reflux disease)    • Hypertension    • Osteoporosis    • PONV (postoperative nausea and vomiting)    • Rheumatoid arthritis          Past Surgical History:   Procedure Laterality Date   •  SECTION     • FEMUR FRACTURE SURGERY      placement of the esperanza   • FRACTURE SURGERY Right     rue   • HAND SURGERY Right    • REPLACEMENT TOTAL KNEE Left    • TOTAL HIP ARTHROPLASTY      placed plates and screws         Family History   Problem Relation Age of Onset   • Hypertension Father    • Cancer Father        Allergies   Allergen Reactions   • Sulfa Antibiotics Nausea Only     Intense nausa   • Doxycycline Other (See Comments)     Body movemen   • Nitrofurantoin Other (See Comments)     Makes her hurt all over.  Tendons felt like being tore     • Ciprofloxacin Nausea Only       Social History     Social History   • Marital status:      Spouse name: N/A   • Number of children: N/A   • Years of education: N/A  "    Occupational History   • Not on file.     Social History Main Topics   • Smoking status: Former Smoker     Years: 10.00     Quit date: 1990   • Smokeless tobacco: Never Used   • Alcohol use No   • Drug use: No   • Sexual activity: Defer     Other Topics Concern   • Not on file     Social History Narrative   • No narrative on file         Current Outpatient Prescriptions   Medication Sig Dispense Refill   • Calcium & Magnesium Carbonates (MYLANTA PO) Take 1 tablet by mouth daily as needed. 1 tablet, chewable every day Oral PRN     • ferrous sulfate 325 (65 FE) MG tablet Take 325 mg by mouth daily.     • flintstones complete (FLINTSTONES) 60 MG chewable tablet Chew 1 tablet Daily.     • HYDROcodone-acetaminophen (NORCO)  MG per tablet Take 1 tablet by mouth 5 (Five) Times a Day.     • hydroxychloroquine (PLAQUENIL) 200 MG tablet Take 200 mg by mouth Daily.     • oxybutynin (DITROPAN) 5 MG tablet Take 5 mg by mouth 2 (two) times a day.     • Polyethylene Glycol 3350 (MIRALAX PO) Take  by mouth Daily As Needed. Miralax 17 gram/dose Oral Powder        • Probiotic Product ([employer] ADULT PROBIOTIC PO) Take 1 each by mouth Daily.     • ranitidine (ZANTAC) 150 MG tablet Take 150 mg by mouth daily as needed for heartburn.     • simethicone (MYLICON) 80 MG chewable tablet Chew 160 mg daily as needed.     • lisinopril-hydrochlorothiazide (PRINZIDE,ZESTORETIC) 20-12.5 MG per tablet Take 1 tablet by mouth Daily.     • omeprazole (priLOSEC) 40 MG capsule Take 40 mg by mouth Daily.     • PREDNISONE PO Take 4 mg by mouth Daily.       No current facility-administered medications for this visit.          OBJECTIVE    Ht 162.6 cm (64\")   Wt 66.5 kg (146 lb 9.7 oz)   LMP  (LMP Unknown)   BMI 25.16 kg/m²       Review of Systems   Constitutional: Negative.    HENT: Negative.    Eyes: Negative.    Respiratory: Negative.    Cardiovascular: Negative.    Gastrointestinal: Negative.    Endocrine: Negative.    Genitourinary: Negative.  "   Musculoskeletal: Positive for arthralgias, back pain and joint swelling.        Joint pain   Skin: Positive for wound.   Allergic/Immunologic: Negative.    Neurological: Negative.    Hematological: Negative.    Psychiatric/Behavioral: Negative.            Physical Exam   Constitutional: She is oriented to person, place, and time. She appears well-developed and well-nourished.   HENT:   Head: Normocephalic and atraumatic.   Nose: Nose normal.   Eyes: Conjunctivae and EOM are normal. Pupils are equal, round, and reactive to light.   Pulmonary/Chest: Effort normal. No respiratory distress. She has no wheezes.   Neurological: She is alert and oriented to person, place, and time.   Skin: She is not diaphoretic.   Psychiatric: She has a normal mood and affect. Her behavior is normal.   Vitals reviewed.      Assistive Device: Motorized scooter    Lower Extremity    Cardiovascular:    DP/PT pulses palpable    CFT brisk  to all digits  Skin temp is warm to warm from proximal tibia to distal digits    Musculoskeletal:  Muscle strength is 4/5 for all muscle groups tested   Toes 1 through 5 bilateral are rigidly contracted.    Dermatological:   Webspaces 1-4 bilateral are clean, dry and intact.   No subcutaneous nodules or masses noted    A full-thickness ulceration noted to the dorsal aspect of the left second digit.  There is purulent drainage noted.  There is bone exposed within the wound.  There is surrounding erythema and edema.  No foul odor noted.    Neurological:   Protective sensation intact    Sensation intact to light touch        Procedures        ASSESSMENT AND PLAN    Neetu was seen today for wound check.    Diagnoses and all orders for this visit:    Skin ulcer of second toe of left foot with necrosis of bone  -     Case Request; Standing  -     ceFAZolin (ANCEF) 2 g in sodium chloride 0.9 % 100 mL IVPB; Infuse 2 g into a venous catheter 1 (One) Time.  -     Case Request    Rheumatoid arthritis involving both  feet, unspecified rheumatoid factor presence    Hammer toes of both feet    Other orders  -     Follow Anesthesia Guidelines / Standing Orders; Standing  -     Verify NPO Status; Standing  -     Obtain informed consent (if not collected inpatient or PAT); Standing  -     Notify Physician - Standard; Standing  -     Follow Anesthesia Guidelines / Standing Orders; Future      - Comprehensive foot and ankle exam performed.   - X-rays reviewed taken by the wound care center.  No definite evidence of lytic bony lesion.  Dislocated and contracted digits noted bilateral. degenerative changes to both feet noted.  - Lengthy discussion held with patient regarding continued conservative care versus surgical intervention.  Patient elected for surgical intervention at this time.  Risks and benefits were discussed in detail.  No guarantees were given or implied.  Plan will be for isolated left second digit amputation  - All questions were answered to the patients satisfaction.  - RTC after surgery          This document has been electronically signed by Ronni Sauceda DPM on June 13, 2018 12:39 PM     6/13/2018  12:39 PM

## 2018-06-13 NOTE — DISCHARGE INSTRUCTIONS
[facility]  Pre-op Information and Guidelines    You will be called after 2 p.m. the day before your surgery (Friday for Monday surgery) and notified of your time for arrival and approximate surgery time.  If you have not received a call by 4P.M., please contact Same Day Surgery at [phone] of if outside [facility] call [phone].    Please Follow these Important Safety Guidelines:    • The morning of your procedure, take only the medications listed below with   A sip of water:_____________________________________________       __NORCO, OMEPRAZOLE, PREDNISONE_________    • DO NOT eat or drink anything after 12:00 midnight the night before surgery  Specific instructions concerning drinking clear liquids will be discussed during  the pre-surgery instruction call the day before your surgery.    • If you take a blood thinner (ex. Plavix, Coumadin, aspirin), ask your doctor when to stop it before surgery  STOP DATE: _________________    • Only 2 visitors are allowed in patient rooms at a time  Your visitors will be asked to wait in the lobby until the admission process is complete with the exception of a parent with a child and patients in need of special assistance.    • YOU CANNOT DRIVE YOURSELF HOME  You must be accompanied by someone who will be responsible for driving you home after surgery and for your care at home.    • DO NOT chew gum, use breath mints, hard candy, or smoke the day of surgery  • DO NOT drink alcohol for at least 24 hours before your surgery  • DO NOT wear any jewelry and remove all body piercing before coming to the hospital  • DO NOT wear make-up to the hospital  • If you are having surgery on an extremity (arm/leg/foot) remove nail polish/artificial nails on the surgical side  • Clothing, glasses, contacts, dentures, and hairpieces must be removed before surgery  • Bathe the night before or the morning of your surgery and do not use powders/lotions on  skin.

## 2018-06-13 NOTE — ANESTHESIA PREPROCEDURE EVALUATION
Anesthesia Evaluation     Patient summary reviewed and Nursing notes reviewed   history of anesthetic complications: PONV  NPO Solid Status: > 8 hours  NPO Liquid Status: > 8 hours           Airway   Mallampati: II  TM distance: >3 FB  Neck ROM: full  possible difficult intubation  Dental    (+) poor dentation    Pulmonary - negative pulmonary ROS and normal exam    breath sounds clear to auscultation  Cardiovascular - normal exam    ECG reviewed  Rhythm: regular  Rate: abnormal    (+) hypertension 2 medications or greater,   (-) murmur, carotid bruits    ROS comment: Reviewed EKG. Sinus tachycardia/no PVC's noted. Sinus tachycardia with premature supraventricular complexes  Left axis deviation  Anterolateral infarct , age undetermined  Abnormal ECG  When compared with ECG of 02-NOV-2012 00:23,  premature supraventricular complexes are now present  Anterior infarct is now present  Anterolateral infarct is now present  Nonspecific T wave abnormality, improved in Inferior leads    Referred By:             Confirmed By:     Specimen Collected: 06/13/18 09:28 Last Resulted: 06/13/18 10:56          Neuro/Psych- negative ROS  GI/Hepatic/Renal/Endo    (+)  GERD well controlled,      ROS Comment: Sodium 129. K+ 3.0. Creatinine 0.43    Musculoskeletal     Abdominal    Substance History - negative use     OB/GYN negative ob/gyn ROS         Other   (+) arthritis (Rheumatoid arthritis on chronic prednisone.)         Phys Exam Other: Bilateral hands with contractions secondary to RA.                Anesthesia Plan    ASA 4     MAC     intravenous induction   Anesthetic plan and risks discussed with patient and spouse/significant other.

## 2018-06-14 ENCOUNTER — ANESTHESIA (OUTPATIENT)
Dept: PERIOP | Facility: HOSPITAL | Age: 68
End: 2018-06-14

## 2018-06-14 ENCOUNTER — HOSPITAL ENCOUNTER (OUTPATIENT)
Facility: HOSPITAL | Age: 68
Setting detail: HOSPITAL OUTPATIENT SURGERY
Discharge: HOME | End: 2018-06-14
Attending: PODIATRIST | Admitting: PODIATRIST

## 2018-06-14 VITALS
DIASTOLIC BLOOD PRESSURE: 81 MMHG | BODY MASS INDEX: 21.11 KG/M2 | HEIGHT: 64 IN | SYSTOLIC BLOOD PRESSURE: 138 MMHG | RESPIRATION RATE: 20 BRPM | WEIGHT: 123.68 LBS | OXYGEN SATURATION: 98 % | HEART RATE: 88 BPM | TEMPERATURE: 97.4 F

## 2018-06-14 DIAGNOSIS — L97.524 SKIN ULCER OF SECOND TOE OF LEFT FOOT WITH NECROSIS OF BONE (HCC): ICD-10-CM

## 2018-06-14 PROCEDURE — 88311 DECALCIFY TISSUE: CPT | Performed by: PODIATRIST

## 2018-06-14 PROCEDURE — 88311 DECALCIFY TISSUE: CPT | Performed by: PATHOLOGY

## 2018-06-14 PROCEDURE — 25010000002 FENTANYL CITRATE (PF) 100 MCG/2ML SOLUTION: Performed by: NURSE ANESTHETIST, CERTIFIED REGISTERED

## 2018-06-14 PROCEDURE — 88305 TISSUE EXAM BY PATHOLOGIST: CPT | Performed by: PODIATRIST

## 2018-06-14 PROCEDURE — 88305 TISSUE EXAM BY PATHOLOGIST: CPT | Performed by: PATHOLOGY

## 2018-06-14 PROCEDURE — 25010000002 MIDAZOLAM PER 1 MG: Performed by: NURSE ANESTHETIST, CERTIFIED REGISTERED

## 2018-06-14 PROCEDURE — 28820 AMPUTATION OF TOE: CPT | Performed by: PODIATRIST

## 2018-06-14 PROCEDURE — 25010000002 PROPOFOL 10 MG/ML EMULSION: Performed by: NURSE ANESTHETIST, CERTIFIED REGISTERED

## 2018-06-14 RX ORDER — SODIUM CHLORIDE, SODIUM GLUCONATE, SODIUM ACETATE, POTASSIUM CHLORIDE, AND MAGNESIUM CHLORIDE 526; 502; 368; 37; 30 MG/100ML; MG/100ML; MG/100ML; MG/100ML; MG/100ML
1000 INJECTION, SOLUTION INTRAVENOUS CONTINUOUS
Status: DISCONTINUED | OUTPATIENT
Start: 2018-06-14 | End: 2018-06-14 | Stop reason: HOSPADM

## 2018-06-14 RX ORDER — PROMETHAZINE HYDROCHLORIDE 25 MG/ML
12.5 INJECTION, SOLUTION INTRAMUSCULAR; INTRAVENOUS ONCE AS NEEDED
Status: DISCONTINUED | OUTPATIENT
Start: 2018-06-14 | End: 2018-06-14 | Stop reason: HOSPADM

## 2018-06-14 RX ORDER — PROMETHAZINE HYDROCHLORIDE 25 MG/1
25 TABLET ORAL ONCE AS NEEDED
Status: DISCONTINUED | OUTPATIENT
Start: 2018-06-14 | End: 2018-06-14 | Stop reason: HOSPADM

## 2018-06-14 RX ORDER — DEXAMETHASONE SODIUM PHOSPHATE 4 MG/ML
8 INJECTION, SOLUTION INTRA-ARTICULAR; INTRALESIONAL; INTRAMUSCULAR; INTRAVENOUS; SOFT TISSUE ONCE AS NEEDED
Status: DISCONTINUED | OUTPATIENT
Start: 2018-06-14 | End: 2018-06-14 | Stop reason: HOSPADM

## 2018-06-14 RX ORDER — ONDANSETRON 2 MG/ML
4 INJECTION INTRAMUSCULAR; INTRAVENOUS ONCE AS NEEDED
Status: DISCONTINUED | OUTPATIENT
Start: 2018-06-14 | End: 2018-06-14 | Stop reason: HOSPADM

## 2018-06-14 RX ORDER — MEPERIDINE HYDROCHLORIDE 50 MG/ML
12.5 INJECTION INTRAMUSCULAR; INTRAVENOUS; SUBCUTANEOUS
Status: DISCONTINUED | OUTPATIENT
Start: 2018-06-14 | End: 2018-06-14 | Stop reason: HOSPADM

## 2018-06-14 RX ORDER — PROMETHAZINE HYDROCHLORIDE 25 MG/1
25 SUPPOSITORY RECTAL ONCE AS NEEDED
Status: DISCONTINUED | OUTPATIENT
Start: 2018-06-14 | End: 2018-06-14 | Stop reason: HOSPADM

## 2018-06-14 RX ORDER — BUPIVACAINE HCL/0.9 % NACL/PF 0.1 %
2 PLASTIC BAG, INJECTION (ML) EPIDURAL ONCE
Status: COMPLETED | OUTPATIENT
Start: 2018-06-14 | End: 2018-06-14

## 2018-06-14 RX ORDER — PROPOFOL 10 MG/ML
VIAL (ML) INTRAVENOUS AS NEEDED
Status: DISCONTINUED | OUTPATIENT
Start: 2018-06-14 | End: 2018-06-14 | Stop reason: SURG

## 2018-06-14 RX ORDER — MIDAZOLAM HYDROCHLORIDE 1 MG/ML
INJECTION INTRAMUSCULAR; INTRAVENOUS AS NEEDED
Status: DISCONTINUED | OUTPATIENT
Start: 2018-06-14 | End: 2018-06-14 | Stop reason: SURG

## 2018-06-14 RX ORDER — FENTANYL CITRATE 50 UG/ML
INJECTION, SOLUTION INTRAMUSCULAR; INTRAVENOUS AS NEEDED
Status: DISCONTINUED | OUTPATIENT
Start: 2018-06-14 | End: 2018-06-14 | Stop reason: SURG

## 2018-06-14 RX ORDER — BUPIVACAINE HYDROCHLORIDE 5 MG/ML
INJECTION, SOLUTION EPIDURAL; INTRACAUDAL AS NEEDED
Status: DISCONTINUED | OUTPATIENT
Start: 2018-06-14 | End: 2018-06-14 | Stop reason: HOSPADM

## 2018-06-14 RX ADMIN — PROPOFOL 40 MG: 10 INJECTION, EMULSION INTRAVENOUS at 07:59

## 2018-06-14 RX ADMIN — FENTANYL CITRATE 50 MCG: 50 INJECTION, SOLUTION INTRAMUSCULAR; INTRAVENOUS at 07:54

## 2018-06-14 RX ADMIN — FENTANYL CITRATE 50 MCG: 50 INJECTION, SOLUTION INTRAMUSCULAR; INTRAVENOUS at 08:23

## 2018-06-14 RX ADMIN — PROPOFOL 20 MG: 10 INJECTION, EMULSION INTRAVENOUS at 08:07

## 2018-06-14 RX ADMIN — Medication 2 G: at 07:57

## 2018-06-14 RX ADMIN — PROPOFOL 60 MG: 10 INJECTION, EMULSION INTRAVENOUS at 08:12

## 2018-06-14 RX ADMIN — SODIUM CHLORIDE, SODIUM GLUCONATE, SODIUM ACETATE, POTASSIUM CHLORIDE, AND MAGNESIUM CHLORIDE 1000 ML: 526; 502; 368; 37; 30 INJECTION, SOLUTION INTRAVENOUS at 07:12

## 2018-06-14 RX ADMIN — PROPOFOL 40 MG: 10 INJECTION, EMULSION INTRAVENOUS at 08:02

## 2018-06-14 RX ADMIN — MIDAZOLAM 2 MG: 1 INJECTION INTRAMUSCULAR; INTRAVENOUS at 07:51

## 2018-06-14 RX ADMIN — PROPOFOL 20 MG: 10 INJECTION, EMULSION INTRAVENOUS at 07:56

## 2018-06-14 RX ADMIN — PROPOFOL 20 MG: 10 INJECTION, EMULSION INTRAVENOUS at 08:04

## 2018-06-14 NOTE — ANESTHESIA PROCEDURE NOTES
Airway  Urgency: elective    Date/Time: 6/14/2018 8:13 AM  End Time:6/14/2018 8:13 AM  Airway not difficult    General Information and Staff    Patient location during procedure: OR  CRNA: LLOYD CODY    Indications and Patient Condition  Indications for airway management: airway protection    Preoxygenated: yes  Mask difficulty assessment: 1 - vent by mask    Final Airway Details  Final airway type: supraglottic airway      Successful airway: I-gel  Size 3    Number of attempts at approach: 1

## 2018-06-14 NOTE — BRIEF OP NOTE
AMPUTATION DIGIT  Progress Note    Neetu Howe  6/14/2018    Pre-op Diagnosis:   Skin ulcer of second toe of left foot with necrosis of bone [L97.524]       Post-Op Diagnosis Codes:     * Skin ulcer of second toe of left foot with necrosis of bone [L97.524]    Procedure/CPT® Codes:      Procedure(s):  AMPUTATION DIGIT    Surgeon(s):  Ronni Sauceda DPM    Anesthesia: Choice    Staff:   Circulator: Emani Dunn RN  Scrub Person: Troy Onofre  Assistant: Virginie Tijerina    Estimated Blood Loss: minimal    Urine Voided: * No values recorded between 6/14/2018  7:52 AM and 6/14/2018  8:37 AM *    Specimens:                ID Type Source Tests Collected by Time   A : left second toe  Tissue Toe, Left TISSUE PATHOLOGY EXAM Ronni Sauceda DPM 6/14/2018 0825         Drains:      Findings: consistent with pre-op dx    Complications: none      Ronni Sauceda DPM     Date: 6/14/2018  Time: 8:41 AM

## 2018-06-14 NOTE — ANESTHESIA POSTPROCEDURE EVALUATION
Patient: Neetu Howe    Procedure Summary     Date:  06/14/18 Room / Location:  [facility] OR 08 / [facility] OR    Anesthesia Start:  0754 Anesthesia Stop:  0842    Procedure:  AMPUTATION DIGIT (Left ) Diagnosis:       Skin ulcer of second toe of left foot with necrosis of bone      (Skin ulcer of second toe of left foot with necrosis of bone [L97.524])    Surgeon:  Ronni Sauceda DPM Provider:  Reece Johnson MD    Anesthesia Type:  MAC ASA Status:  4          Anesthesia Type: MAC  Last vitals  BP   152/72 (06/14/18 0704)   Temp   97.2 °F (36.2 °C) (06/14/18 0704)   Pulse   84 (06/14/18 0704)   Resp   18 (06/14/18 0704)     SpO2   98 % (06/14/18 0704)     Post Anesthesia Care and Evaluation    Patient location during evaluation: PACU  Patient participation: complete - patient participated  Level of consciousness: sleepy but conscious  Pain score: 0  Pain management: adequate  Airway patency: patent  Anesthetic complications: No anesthetic complications  PONV Status: none  Cardiovascular status: acceptable  Respiratory status: acceptable  Hydration status: acceptable

## 2018-06-14 NOTE — DISCHARGE INSTRUCTIONS
Discharge home once stable per MDA  Resume normal diet as tolerated  Keep dressing c/d/i  wbat in surgical shoe  Follow up Monday 6/18/18          This document has been electronically signed by Ronni Sauceda DPM on June 14, 2018 8:42 AM

## 2018-06-14 NOTE — INTERVAL H&P NOTE
H&P reviewed. The patient was examined and there are no changes to the H&P.             This document has been electronically signed by Ronni Sauceda DPM on June 14, 2018 7:01 AM

## 2018-06-15 LAB
LAB AP CASE REPORT: NORMAL
Lab: NORMAL
PATH REPORT.FINAL DX SPEC: NORMAL
PATH REPORT.GROSS SPEC: NORMAL

## 2018-06-15 NOTE — OP NOTE
Date of Procedure: 06/14/2018     SURGEON: Ronni Sauceda DPM      ASSISTANT: TRAN Richards      PREOPERATIVE DIAGNOSIS: Ulcer left second toe with necrosis of bone     POSTOPERATIVE DIAGNOSIS: Ulcer left second toe with necrosis of bone     PROCEDURES PERFORMED: Left second digit amputation at the metatarsophalangeal joint     ANESTHESIA: Gen.     HEMOSTASIS: Direct pressure      ESTIMATED BLOOD LOSS: 10 mL.      MATERIALS: None     INJECTABLES: 10 cc of half percent marcaine plain     COMPLICATIONS: None.      CONDITION: Stable.     PATHOLOGY: Bone and tissue left second toe     INDICATIONS FOR PROCEDURE: This is a patient of mine who has a nonhealing ulcer to her left second toe with bone exposed.  She agrees to undergo the surgical intervention at this time. Consent is signed and documented in the chart. History and physical exam were reviewed prior to patient being taken to the operating room and n.p.o. status was confirmed. No guarantees were given or implied regarding the outcome of this treatment.      DESCRIPTION OF PROCEDURE: After mild sedation was administered by the anesthesia team in the preoperative holding area the patient was transported to the operating room and placed in a supine position.  General anesthesia was then administered and the left foot was prepped and draped in usual sterile technique and a timeout was performed to confirm the correct patient, correct site, and correct procedure.      Attention was then directed to the left foot where utilizing a 15 blade the left second digit was disarticulated at the metatarsal phalangeal joint.  The operative site was flushed with copious amounts of normal saline solution.  A layered closure was performed utilizing 3-0 Vicryl for the deep tissue and 4-0 Prolene for the skin.  A sterile dressing was applied.  Gen. anesthesia was reversed.  The patient tolerated the procedure and anesthesia well and was transferred from operating room to the  PACU with vital signs stable and neurovascular status intact to the operative extremity.    Plan is to discharge patient home once stable per anesthesia.  She can resume her lie.  She can be weightbearing as tolerated in a surgical shoe.  She will follow-up with me on Monday, June 18, 2018.            This document has been electronically signed by Ronni Sauceda DPM on Kate 15, 2018 1:17 PM

## 2018-06-18 ENCOUNTER — OFFICE VISIT (OUTPATIENT)
Dept: PODIATRY | Facility: CLINIC | Age: 68
End: 2018-06-18

## 2018-06-18 VITALS — HEIGHT: 64 IN | BODY MASS INDEX: 21.11 KG/M2 | WEIGHT: 123.68 LBS

## 2018-06-18 DIAGNOSIS — L97.524 SKIN ULCER OF SECOND TOE OF LEFT FOOT WITH NECROSIS OF BONE (HCC): Primary | ICD-10-CM

## 2018-06-18 PROCEDURE — 99024 POSTOP FOLLOW-UP VISIT: CPT | Performed by: PODIATRIST

## 2018-06-18 NOTE — PROGRESS NOTES
Neetu Howe  1950  67 y.o. female  Patient presents today for left foot post op        Chief Complaint   Patient presents with   • Left Foot - Post-op       History of Present Illness    Patient presents to clinic today for her first postoperative visit.  She had left second digit amputation date of surgery 2018.  Her dressing is clean, dry and intact.      Past Medical History:   Diagnosis Date   • Callus    • Chronic pain    • GERD (gastroesophageal reflux disease)    • Hypertension    • Osteoporosis    • PONV (postoperative nausea and vomiting)    • Rheumatoid arthritis          Past Surgical History:   Procedure Laterality Date   • AMPUTATION DIGIT Left 2018    Procedure: AMPUTATION DIGIT;  Surgeon: Ronni Sauceda DPM;  Location: [facility];  Service: Podiatry   •  SECTION     • FEMUR FRACTURE SURGERY      placement of the esperanza   • FRACTURE SURGERY Right     rue   • HAND SURGERY Right    • REPLACEMENT TOTAL KNEE Left    • TOTAL HIP ARTHROPLASTY      placed plates and screws         Family History   Problem Relation Age of Onset   • Hypertension Father    • Cancer Father        Allergies   Allergen Reactions   • Sulfa Antibiotics Nausea Only     Intense nausa   • Doxycycline Other (See Comments)     Body movemen   • Nitrofurantoin Other (See Comments)     Makes her hurt all over.  Tendons felt like being tore     • Ciprofloxacin Nausea Only       Social History     Social History   • Marital status:      Spouse name: N/A   • Number of children: N/A   • Years of education: N/A     Occupational History   • Not on file.     Social History Main Topics   • Smoking status: Former Smoker     Years: 10.     Quit date:    • Smokeless tobacco: Never Used   • Alcohol use No   • Drug use: No   • Sexual activity: Defer     Other Topics Concern   • Not on file     Social History Narrative   • No narrative on file         Current Outpatient Prescriptions   Medication Sig  "Dispense Refill   • Calcium & Magnesium Carbonates (MYLANTA PO) Take 1 tablet by mouth daily as needed. 1 tablet, chewable every day Oral PRN     • flintstones complete (FLINTSTONES) 60 MG chewable tablet Chew 1 tablet Daily.     • HYDROcodone-acetaminophen (NORCO)  MG per tablet Take 1 tablet by mouth 5 (Five) Times a Day.     • hydroxychloroquine (PLAQUENIL) 200 MG tablet Take 200 mg by mouth Daily.     • lisinopril-hydrochlorothiazide (PRINZIDE,ZESTORETIC) 20-12.5 MG per tablet Take 1 tablet by mouth Daily.     • omeprazole (priLOSEC) 40 MG capsule Take 40 mg by mouth Daily.     • oxybutynin (DITROPAN) 5 MG tablet Take 5 mg by mouth 2 (two) times a day.     • Polyethylene Glycol 3350 (MIRALAX PO) Take  by mouth Daily As Needed. Miralax 17 gram/dose Oral Powder        • PREDNISONE PO Take 4 mg by mouth Daily.     • Probiotic Product ([employer] ADULT PROBIOTIC PO) Take 1 each by mouth Daily.     • ranitidine (ZANTAC) 150 MG tablet Take 150 mg by mouth daily as needed for heartburn.       No current facility-administered medications for this visit.          OBJECTIVE    Ht 162.6 cm (64.02\")   Wt 56.1 kg (123 lb 10.9 oz)   LMP  (LMP Unknown)   BMI 21.22 kg/m²       Review of Systems   Constitutional: Negative.    HENT: Positive for facial swelling.    Respiratory: Negative.    Cardiovascular: Negative.    Gastrointestinal: Negative.    Genitourinary: Negative.    Musculoskeletal: Positive for arthralgias, back pain and joint swelling.        Joint pain   Neurological: Negative.    Psychiatric/Behavioral: Negative.            Physical Exam   Constitutional: She is oriented to person, place, and time. She appears well-developed and well-nourished.   HENT:   Head: Normocephalic and atraumatic.   Nose: Nose normal.   Eyes: Conjunctivae and EOM are normal. Pupils are equal, round, and reactive to light.   Pulmonary/Chest: Effort normal. No respiratory distress. She has no wheezes.   Neurological: She is alert and " oriented to person, place, and time.   Skin: She is not diaphoretic.   Psychiatric: She has a normal mood and affect. Her behavior is normal.       Assistive Device: Motorized scooter    Left Lower Extremity: Incision site is well approximated 2 second digit amputation.  Incisional erythema noted.  No drainage or foul odor noted.      Procedures        ASSESSMENT AND PLAN    Neetu was seen today for post-op.    Diagnoses and all orders for this visit:    Skin ulcer of second toe of left foot with necrosis of bone      - Sterile dressing change performed.  Keep clean, dry and intact  - Weightbearing as tolerated in surgical shoe  - All her questions were answered  - Return to clinic in 1 week          This document has been electronically signed by Ronni Sauceda DPM on June 18, 2018 2:04 PM     6/18/2018  2:04 PM

## 2018-06-25 ENCOUNTER — OFFICE VISIT (OUTPATIENT)
Dept: PODIATRY | Facility: CLINIC | Age: 68
End: 2018-06-25

## 2018-06-25 VITALS — BODY MASS INDEX: 21.11 KG/M2 | WEIGHT: 123.68 LBS | HEIGHT: 64 IN

## 2018-06-25 DIAGNOSIS — L97.524 SKIN ULCER OF SECOND TOE OF LEFT FOOT WITH NECROSIS OF BONE (HCC): Primary | ICD-10-CM

## 2018-06-25 DIAGNOSIS — M06.9 RHEUMATOID ARTHRITIS INVOLVING BOTH FEET, UNSPECIFIED RHEUMATOID FACTOR PRESENCE: ICD-10-CM

## 2018-06-25 PROCEDURE — 99024 POSTOP FOLLOW-UP VISIT: CPT | Performed by: PODIATRIST

## 2018-06-25 NOTE — PROGRESS NOTES
Neetu Howe  1950  67 y.o. female  Patient presents today for left foot post op        Chief Complaint   Patient presents with   • Left Foot - Post-op       History of Present Illness    Patient presents to clinic for her second postoperative visit.  She had left second digit amputation date of surgery 2018.  Her dressing is clean, dry and intact.  She denies pain.  She is ambulating in a surgical shoe with a motorized wheelchair.    Past Medical History:   Diagnosis Date   • Callus    • Chronic pain    • GERD (gastroesophageal reflux disease)    • Hypertension    • Osteoporosis    • PONV (postoperative nausea and vomiting)    • Rheumatoid arthritis          Past Surgical History:   Procedure Laterality Date   • AMPUTATION DIGIT Left 2018    Procedure: AMPUTATION DIGIT;  Surgeon: Ronni Sauceda DPM;  Location: [facility];  Service: Podiatry   •  SECTION     • FEMUR FRACTURE SURGERY      placement of the esperanza   • FRACTURE SURGERY Right     rue   • HAND SURGERY Right    • REPLACEMENT TOTAL KNEE Left    • TOTAL HIP ARTHROPLASTY      placed plates and screws         Family History   Problem Relation Age of Onset   • Hypertension Father    • Cancer Father        Allergies   Allergen Reactions   • Sulfa Antibiotics Nausea Only     Intense nausa   • Doxycycline Other (See Comments)     Body movemen   • Nitrofurantoin Other (See Comments)     Makes her hurt all over.  Tendons felt like being tore     • Ciprofloxacin Nausea Only       Social History     Social History   • Marital status:      Spouse name: N/A   • Number of children: N/A   • Years of education: N/A     Occupational History   • Not on file.     Social History Main Topics   • Smoking status: Former Smoker     Years: 10.00     Quit date:    • Smokeless tobacco: Never Used   • Alcohol use No   • Drug use: No   • Sexual activity: Defer     Other Topics Concern   • Not on file     Social History Narrative   •  "No narrative on file         Current Outpatient Prescriptions   Medication Sig Dispense Refill   • Calcium & Magnesium Carbonates (MYLANTA PO) Take 1 tablet by mouth daily as needed. 1 tablet, chewable every day Oral PRN     • flintstones complete (FLINTSTONES) 60 MG chewable tablet Chew 1 tablet Daily.     • HYDROcodone-acetaminophen (NORCO)  MG per tablet Take 1 tablet by mouth 5 (Five) Times a Day.     • hydroxychloroquine (PLAQUENIL) 200 MG tablet Take 200 mg by mouth Daily.     • lisinopril-hydrochlorothiazide (PRINZIDE,ZESTORETIC) 20-12.5 MG per tablet Take 1 tablet by mouth Daily.     • omeprazole (priLOSEC) 40 MG capsule Take 40 mg by mouth Daily.     • oxybutynin (DITROPAN) 5 MG tablet Take 5 mg by mouth 2 (two) times a day.     • Polyethylene Glycol 3350 (MIRALAX PO) Take  by mouth Daily As Needed. Miralax 17 gram/dose Oral Powder        • PREDNISONE PO Take 4 mg by mouth Daily.     • Probiotic Product ([employer] ADULT PROBIOTIC PO) Take 1 each by mouth Daily.     • ranitidine (ZANTAC) 150 MG tablet Take 150 mg by mouth daily as needed for heartburn.       No current facility-administered medications for this visit.          OBJECTIVE    Ht 162.6 cm (64.02\")   Wt 56.1 kg (123 lb 10.9 oz)   LMP  (LMP Unknown)   BMI 21.22 kg/m²       Review of Systems   Constitutional: Negative.    HENT: Positive for facial swelling.    Respiratory: Negative.    Cardiovascular: Negative.    Gastrointestinal: Negative.    Genitourinary: Negative.    Musculoskeletal: Positive for arthralgias, back pain and joint swelling.        Joint pain   Neurological: Negative.    Psychiatric/Behavioral: Negative.            Physical Exam   Constitutional: She is oriented to person, place, and time. She appears well-developed and well-nourished.   HENT:   Head: Normocephalic and atraumatic.   Nose: Nose normal.   Eyes: Conjunctivae and EOM are normal. Pupils are equal, round, and reactive to light.   Pulmonary/Chest: Effort normal. No " respiratory distress. She has no wheezes.   Neurological: She is alert and oriented to person, place, and time.   Skin: She is not diaphoretic.   Psychiatric: She has a normal mood and affect. Her behavior is normal.       Assistive Device: Motorized scooter    Left Lower Extremity: Incision site is well approximated to second digit amputation.  Improved erythema noted.  No drainage or foul odor noted.      Procedures        ASSESSMENT AND PLAN    Neetu was seen today for post-op.    Diagnoses and all orders for this visit:    Skin ulcer of second toe of left foot with necrosis of bone    Rheumatoid arthritis involving both feet, unspecified rheumatoid factor presence      - Sterile dressing change performed.  Keep clean, dry and intact  - Weightbearing as tolerated in surgical shoe  - All her questions were answered  - Return to clinic in 1 week, suture removal          This document has been electronically signed by Ronni Sauceda DPM on June 25, 2018 1:17 PM     6/25/2018  1:17 PM

## 2018-07-02 ENCOUNTER — OFFICE VISIT (OUTPATIENT)
Dept: PODIATRY | Facility: CLINIC | Age: 68
End: 2018-07-02

## 2018-07-02 VITALS — BODY MASS INDEX: 21.17 KG/M2 | HEIGHT: 64 IN | WEIGHT: 124 LBS

## 2018-07-02 DIAGNOSIS — Z89.422 STATUS POST AMPUTATION OF TOE OF LEFT FOOT (HCC): Primary | ICD-10-CM

## 2018-07-02 DIAGNOSIS — L97.524 SKIN ULCER OF SECOND TOE OF LEFT FOOT WITH NECROSIS OF BONE (HCC): ICD-10-CM

## 2018-07-02 PROCEDURE — 99024 POSTOP FOLLOW-UP VISIT: CPT | Performed by: PODIATRIST

## 2018-07-02 NOTE — PROGRESS NOTES
Neetu Howe  1950  67 y.o. female  PCP: Elissa Fernandes MD      Patient presents today for follow up & suture removal, status post amputation of digit- LT foot. Date of surgery- 2018.         Chief Complaint   Patient presents with   • Left Foot - Follow-up       History of Present Illness    Patient presents to clinic for her third postoperative visit.  She had left second digit amputation date of surgery 2018.  Her dressing is clean, dry and intact.  She denies pain.  She is ambulating in a surgical shoe with a motorized wheelchair.    Past Medical History:   Diagnosis Date   • Callus    • Chronic pain    • GERD (gastroesophageal reflux disease)    • Hypertension    • Osteoporosis    • PONV (postoperative nausea and vomiting)    • Rheumatoid arthritis          Past Surgical History:   Procedure Laterality Date   • AMPUTATION DIGIT Left 2018    Procedure: AMPUTATION DIGIT;  Surgeon: Ronni Sauceda DPM;  Location: [facility];  Service: Podiatry   •  SECTION     • FEMUR FRACTURE SURGERY      placement of the esperanza   • FRACTURE SURGERY Right     rue   • HAND SURGERY Right    • REPLACEMENT TOTAL KNEE Left    • TOTAL HIP ARTHROPLASTY      placed plates and screws         Family History   Problem Relation Age of Onset   • Hypertension Father    • Cancer Father        Allergies   Allergen Reactions   • Sulfa Antibiotics Nausea Only     Intense nausa   • Doxycycline Other (See Comments)     Body movemen   • Nitrofurantoin Other (See Comments)     Makes her hurt all over.  Tendons felt like being tore     • Ciprofloxacin Nausea Only       Social History     Social History   • Marital status:      Spouse name: N/A   • Number of children: N/A   • Years of education: N/A     Occupational History   • Not on file.     Social History Main Topics   • Smoking status: Former Smoker     Years: 10.00     Quit date:    • Smokeless tobacco: Never Used   • Alcohol use No  "  • Drug use: No   • Sexual activity: Defer     Other Topics Concern   • Not on file     Social History Narrative   • No narrative on file         Current Outpatient Prescriptions   Medication Sig Dispense Refill   • Calcium & Magnesium Carbonates (MYLANTA PO) Take 1 tablet by mouth daily as needed. 1 tablet, chewable every day Oral PRN     • flintstones complete (FLINTSTONES) 60 MG chewable tablet Chew 1 tablet Daily.     • HYDROcodone-acetaminophen (NORCO)  MG per tablet Take 1 tablet by mouth 5 (Five) Times a Day.     • hydroxychloroquine (PLAQUENIL) 200 MG tablet Take 200 mg by mouth Daily.     • lisinopril-hydrochlorothiazide (PRINZIDE,ZESTORETIC) 20-12.5 MG per tablet Take 1 tablet by mouth Daily.     • omeprazole (priLOSEC) 40 MG capsule Take 40 mg by mouth Daily.     • oxybutynin (DITROPAN) 5 MG tablet Take 5 mg by mouth 2 (two) times a day.     • Polyethylene Glycol 3350 (MIRALAX PO) Take  by mouth Daily As Needed. Miralax 17 gram/dose Oral Powder        • PREDNISONE PO Take 4 mg by mouth Daily.     • Probiotic Product ([employer] ADULT PROBIOTIC PO) Take 1 each by mouth Daily.     • ranitidine (ZANTAC) 150 MG tablet Take 150 mg by mouth daily as needed for heartburn.       No current facility-administered medications for this visit.          OBJECTIVE    Ht 162.6 cm (64\")   Wt 56.2 kg (124 lb)   LMP  (LMP Unknown)   BMI 21.28 kg/m²       Review of Systems   Constitutional: Negative.    Respiratory: Negative.    Cardiovascular: Negative.    Genitourinary: Negative.    Musculoskeletal: Positive for arthralgias, back pain and joint swelling.        Joint pain   Neurological: Negative.    Psychiatric/Behavioral: Negative.            Physical Exam   Constitutional: She is oriented to person, place, and time. She appears well-developed and well-nourished.   HENT:   Head: Normocephalic and atraumatic.   Nose: Nose normal.   Eyes: Conjunctivae and EOM are normal. Pupils are equal, round, and reactive to light. "   Pulmonary/Chest: Effort normal. No respiratory distress. She has no wheezes.   Neurological: She is alert and oriented to person, place, and time.   Skin: She is not diaphoretic.   Psychiatric: She has a normal mood and affect. Her behavior is normal.       Assistive Device: Motorized scooter    Left Lower Extremity: Incision site is well approximated to second digit amputation.  No erythema noted.  No drainage or foul odor noted.      Procedures        ASSESSMENT AND PLAN    Neetu was seen today for follow-up.    Diagnoses and all orders for this visit:    Status post amputation of toe of left foot    Skin ulcer of second toe of left foot with necrosis of bone      - Sutures removed.  Weight 48 hours prior to getting wet  - Weightbearing as tolerated in surgical shoe  - All her questions were answered  - Return to clinic in 2 weeks          This document has been electronically signed by Ronni Sauceda DPM on July 2, 2018 1:37 PM     7/2/2018  1:37 PM

## 2018-07-17 ENCOUNTER — OFFICE VISIT (OUTPATIENT)
Dept: PODIATRY | Facility: CLINIC | Age: 68
End: 2018-07-17

## 2018-07-17 VITALS — BODY MASS INDEX: 21.17 KG/M2 | HEIGHT: 64 IN | WEIGHT: 124 LBS

## 2018-07-17 DIAGNOSIS — Z89.422 STATUS POST AMPUTATION OF TOE OF LEFT FOOT (HCC): Primary | ICD-10-CM

## 2018-07-17 DIAGNOSIS — L97.524 SKIN ULCER OF SECOND TOE OF LEFT FOOT WITH NECROSIS OF BONE (HCC): ICD-10-CM

## 2018-07-17 PROCEDURE — 99024 POSTOP FOLLOW-UP VISIT: CPT | Performed by: PODIATRIST

## 2018-07-17 NOTE — PROGRESS NOTES
Neetu Howe  1950  67 y.o. female  PCP: Elissa Fernandes MD      Patient presents today for follow up status post amputation of digit- LT foot. Date of surgery- 2018.       Chief Complaint   Patient presents with   • Left Foot - Follow-up       History of Present Illness    Patient presents to clinic for her  postoperative visit.  She had left second digit amputation date of surgery 2018.  She denies pain. Patient states that this will be her last appointment with me due to the fact that she is moving to [place].    Past Medical History:   Diagnosis Date   • Callus    • Chronic pain    • GERD (gastroesophageal reflux disease)    • Hypertension    • Osteoporosis    • PONV (postoperative nausea and vomiting)    • Rheumatoid arthritis (CMS/HCC)          Past Surgical History:   Procedure Laterality Date   • AMPUTATION DIGIT Left 2018    Procedure: AMPUTATION DIGIT;  Surgeon: Ronni Sauceda DPM;  Location: [facility];  Service: Podiatry   •  SECTION     • FEMUR FRACTURE SURGERY      placement of the esperanza   • FRACTURE SURGERY Right     rue   • HAND SURGERY Right    • REPLACEMENT TOTAL KNEE Left    • TOTAL HIP ARTHROPLASTY      placed plates and screws         Family History   Problem Relation Age of Onset   • Hypertension Father    • Cancer Father        Allergies   Allergen Reactions   • Sulfa Antibiotics Nausea Only     Intense nausa   • Doxycycline Other (See Comments)     Body movemen   • Nitrofurantoin Other (See Comments)     Makes her hurt all over.  Tendons felt like being tore     • Ciprofloxacin Nausea Only       Social History     Social History   • Marital status:      Spouse name: N/A   • Number of children: N/A   • Years of education: N/A     Occupational History   • Not on file.     Social History Main Topics   • Smoking status: Former Smoker     Years: 10.00     Quit date:    • Smokeless tobacco: Never Used   • Alcohol use No   • Drug use:  "No   • Sexual activity: Defer     Other Topics Concern   • Not on file     Social History Narrative   • No narrative on file         Current Outpatient Prescriptions   Medication Sig Dispense Refill   • Calcium & Magnesium Carbonates (MYLANTA PO) Take 1 tablet by mouth daily as needed. 1 tablet, chewable every day Oral PRN     • flintstones complete (FLINTSTONES) 60 MG chewable tablet Chew 1 tablet Daily.     • HYDROcodone-acetaminophen (NORCO)  MG per tablet Take 1 tablet by mouth 5 (Five) Times a Day.     • hydroxychloroquine (PLAQUENIL) 200 MG tablet Take 200 mg by mouth Daily.     • lisinopril-hydrochlorothiazide (PRINZIDE,ZESTORETIC) 20-12.5 MG per tablet Take 1 tablet by mouth Daily.     • omeprazole (priLOSEC) 40 MG capsule Take 40 mg by mouth Daily.     • oxybutynin (DITROPAN) 5 MG tablet Take 5 mg by mouth 2 (two) times a day.     • Polyethylene Glycol 3350 (MIRALAX PO) Take  by mouth Daily As Needed. Miralax 17 gram/dose Oral Powder        • PREDNISONE PO Take 4 mg by mouth Daily.     • Probiotic Product ([employer] ADULT PROBIOTIC PO) Take 1 each by mouth Daily.     • ranitidine (ZANTAC) 150 MG tablet Take 150 mg by mouth daily as needed for heartburn.       No current facility-administered medications for this visit.          OBJECTIVE    Ht 162.6 cm (64\")   Wt 56.2 kg (124 lb)   LMP  (LMP Unknown)   BMI 21.28 kg/m²       Review of Systems   Constitutional: Negative.    Respiratory: Negative.    Cardiovascular: Negative.    Genitourinary: Negative.    Musculoskeletal: Positive for arthralgias, back pain and joint swelling.        Joint pain   Neurological: Negative.    Psychiatric/Behavioral: Negative.            Physical Exam   Constitutional: She is oriented to person, place, and time. She appears well-developed and well-nourished.   HENT:   Head: Normocephalic and atraumatic.   Nose: Nose normal.   Eyes: Conjunctivae and EOM are normal. Pupils are equal, round, and reactive to light. "   Pulmonary/Chest: Effort normal. No respiratory distress. She has no wheezes.   Neurological: She is alert and oriented to person, place, and time.   Skin: She is not diaphoretic.   Psychiatric: She has a normal mood and affect. Her behavior is normal.       Assistive Device: Motorized scooter    Left Lower Extremity: Incision site is healed.  2nd digit amputation at [facility].       Procedures        ASSESSMENT AND PLAN    Neetu was seen today for follow-up.    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

## (undated) DEVICE — SUT PROLN 3/0 SH D/A 36IN 8522H

## (undated) DEVICE — GOWN,AURORA,NOREINF,RAGLAN,XL,STERILE: Brand: MEDLINE

## (undated) DEVICE — GOWN,PREVENTION PLUS,XLNG/XXLARGE,STRL: Brand: MEDLINE

## (undated) DEVICE — SUT PROLN 4/0 RB1 D/A 36IN 8557H

## (undated) DEVICE — GLV SURG SENSICARE GREEN W/ALOE PF LF 8 STRL

## (undated) DEVICE — SPNG GZ WOVN 4X4IN 12PLY 10/BX STRL

## (undated) DEVICE — STERILE POLYISOPRENE POWDER-FREE SURGICAL GLOVES: Brand: PROTEXIS

## (undated) DEVICE — NDL HYPO PRECISIONGLIDE/REG 18G 1IN PNK

## (undated) DEVICE — GLV SURG TRIUMPH ORTHO W/ALOE PF LTX 6.5 STRL

## (undated) DEVICE — SPNG LAP 18X18IN LF STRL PK/5

## (undated) DEVICE — SOL IRR NACL 0.9PCT BT 1000ML

## (undated) DEVICE — PK POD 60

## (undated) DEVICE — NDL HYPO ECLPS SFTY 25G 1 1/2IN

## (undated) DEVICE — SOL CHLORHEXIDINE 4% CHG GLUCONATE 4OZ

## (undated) DEVICE — CONTAINER,SPECIMEN,OR STERILE,4OZ: Brand: MEDLINE

## (undated) DEVICE — ANTIBACTERIAL UNDYED BRAIDED (POLYGLACTIN 910), SYNTHETIC ABSORBABLE SUTURE: Brand: COATED VICRYL

## (undated) DEVICE — STERILE POLYISOPRENE POWDER-FREE SURGICAL GLOVES WITH EMOLLIENT COATING: Brand: PROTEXIS

## (undated) DEVICE — UNDRPD BREATH 23X36 BG/10

## (undated) DEVICE — GLV SURG TRIUMPH ORTHO W/ALOE PF LTX 7.5 STRL

## (undated) DEVICE — SYR 10ML

## (undated) DEVICE — DRSNG GZ CURAD XEROFORM NONADHS 5X9IN STRL